# Patient Record
Sex: MALE | Race: BLACK OR AFRICAN AMERICAN | Employment: FULL TIME | URBAN - METROPOLITAN AREA
[De-identification: names, ages, dates, MRNs, and addresses within clinical notes are randomized per-mention and may not be internally consistent; named-entity substitution may affect disease eponyms.]

---

## 2017-01-03 ENCOUNTER — OFFICE VISIT (OUTPATIENT)
Dept: INTERNAL MEDICINE CLINIC | Age: 57
End: 2017-01-03

## 2017-01-03 VITALS
BODY MASS INDEX: 36.22 KG/M2 | HEART RATE: 81 BPM | TEMPERATURE: 98 F | DIASTOLIC BLOOD PRESSURE: 76 MMHG | RESPIRATION RATE: 16 BRPM | WEIGHT: 253 LBS | OXYGEN SATURATION: 94 % | HEIGHT: 70 IN | SYSTOLIC BLOOD PRESSURE: 130 MMHG

## 2017-01-03 DIAGNOSIS — I10 ESSENTIAL HYPERTENSION: ICD-10-CM

## 2017-01-03 DIAGNOSIS — Z12.11 COLON CANCER SCREENING: ICD-10-CM

## 2017-01-03 DIAGNOSIS — Z11.59 NEED FOR HEPATITIS C SCREENING TEST: ICD-10-CM

## 2017-01-03 DIAGNOSIS — K42.9 UMBILICAL HERNIA WITHOUT OBSTRUCTION AND WITHOUT GANGRENE: ICD-10-CM

## 2017-01-03 DIAGNOSIS — E11.9 TYPE 2 DIABETES MELLITUS WITHOUT COMPLICATION, WITHOUT LONG-TERM CURRENT USE OF INSULIN (HCC): Primary | ICD-10-CM

## 2017-01-03 LAB
CHOLEST SERPL-MCNC: 256 MG/DL
GLUCOSE POC: 186 MG/DL
HBA1C MFR BLD HPLC: 12 %
HDLC SERPL-MCNC: 36 MG/DL
LDL CHOLESTEROL POC: 199
NON-HDL GOAL (POC): 220
TCHOL/HDL RATIO (POC): 7.2
TRIGL SERPL-MCNC: 108 MG/DL

## 2017-01-03 RX ORDER — GLIPIZIDE 10 MG/1
TABLET ORAL
Qty: 60 TAB | Refills: 12 | Status: SHIPPED | OUTPATIENT
Start: 2017-01-03 | End: 2017-02-17 | Stop reason: SDUPTHER

## 2017-01-03 RX ORDER — ATORVASTATIN CALCIUM 40 MG/1
40 TABLET, FILM COATED ORAL DAILY
Qty: 30 TAB | Refills: 12 | Status: SHIPPED | OUTPATIENT
Start: 2017-01-03 | End: 2017-02-17 | Stop reason: SDUPTHER

## 2017-01-03 NOTE — MR AVS SNAPSHOT
Visit Information Date & Time Provider Department Dept. Phone Encounter #  
 1/3/2017  8:15 AM Violette Shane MD [address] [facility] [phone] [id number] Follow-up Instructions Return in about 3 months (around 4/3/2017), or if symptoms worsen or fail to improve. Upcoming Health Maintenance Date Due  
 EYE EXAM RETINAL OR DILATED Q1 6/2/1969 Pneumococcal 19-64 Medium Risk (1 of 1 - PPSV23) 6/2/1978 GLAUCOMA SCREENING Q2Y 6/2/2009 FOBT Q 1 YEAR AGE 50-75 9/29/2015 DTaP/Tdap/Td series (1 - Tdap) 10/26/2017* HEMOGLOBIN A1C Q6M 2/22/2017 MICROALBUMIN Q1 8/22/2017 LIPID PANEL Q1 8/22/2017 FOOT EXAM Q1 1/3/2018 *Topic was postponed. The date shown is not the original due date. Allergies as of 1/3/2017  Review Complete On: 1/3/2017 By: Violette Shane MD  
 No Known Allergies Current Immunizations  Never Reviewed No immunizations on file. Not reviewed this visit You Were Diagnosed With   
  
 Codes Comments Type 2 diabetes mellitus without complication, without long-term current use of insulin (HCC)    -  Primary ICD-10-CM: E11.9 ICD-9-CM: 250.00 Essential hypertension     ICD-10-CM: I10 
ICD-9-CM: 401.9 Need for hepatitis C screening test     ICD-10-CM: Z11.59 
ICD-9-CM: V73.89 Colon cancer screening     ICD-10-CM: Z12.11 ICD-9-CM: V76.51 Umbilical hernia without obstruction and without gangrene     ICD-10-CM: K42.9 ICD-9-CM: 553.1 Vitals BP Pulse Temp Resp Height(growth percentile) Weight(growth percentile) 130/76 81 98 °F (36.7 °C) (Oral) 16 5' 10\" (1.778 m) 253 lb (114.8 kg) SpO2 BMI Smoking Status 94% 36.3 kg/m2 Current Every Day Smoker BMI and BSA Data Body Mass Index Body Surface Area  
 36.3 kg/m 2 2.38 m 2 Preferred Pharmacy Pharmacy Name Phone  [facility] [address] [place] [address] [phone] Your Updated Medication List  
  
   
This list is accurate as of: 1/3/17  9:09 AM.  Always use your most recent med list.  
  
  
  
  
 atorvastatin 40 mg tablet Commonly known as:  LIPITOR Take 1 Tab by mouth daily. glipiZIDE 10 mg tablet Commonly known as:  GLUCOTROL  
TAKE 1 TABLET BY MOUTH TWICE DAILY losartan 25 mg tablet Commonly known as:  COZAAR  
TAKE 1 TABLET BY MOUTH EVERY DAY  
  
 metFORMIN 1,000 mg tablet Commonly known as:  GLUCOPHAGE  
TAKE 1 TABLET BY MOUTH TWICE DAILY WITH MEALS  
  
 sildenafil citrate 100 mg tablet Commonly known as:  VIAGRA Take 1 Tab by mouth as needed. SITagliptin 100 mg tablet Commonly known as:  Penny Homans Take 1 Tab by mouth daily. Prescriptions Sent to Pharmacy Refills  
 atorvastatin (LIPITOR) 40 mg tablet 12 Sig: Take 1 Tab by mouth daily. Class: Normal  
 Pharmacy: [address] Ph #: [phone] Route: Oral  
 glipiZIDE (GLUCOTROL) 10 mg tablet 12 Sig: TAKE 1 TABLET BY MOUTH TWICE DAILY Class: Normal  
 Pharmacy: [address] Ph #: [phone] Follow-up Instructions Return in about 3 months (around 4/3/2017), or if symptoms worsen or fail to improve. Referral Information Referral ID Referred By Referred To  
  
 [id number] [address] Damaris Watson MD   
   [address] [place] [address] Av Phone: [phone] Fax: [phone] Visits Status Start Date End Date 1 New Request 1/3/17 1/3/18 If your referral has a status of pending review or denied, additional information will be sent to support the outcome of this decision. Patient Instructions [employer] Activation Thank you for requesting access to Method CRM. Please follow the instructions below to securely access and download your online medical record. Method CRM allows you to send messages to your doctor, view your test results, renew your prescriptions, schedule appointments, and more. How Do I Sign Up? 1. In your internet browser, go to [email or web address] 
2. Click on the First Time User? Click Here link in the Sign In box. You will be redirect to the New Member Sign Up page. 3. Enter your Method CRM Access Code exactly as it appears below. You will not need to use this code after youve completed the sign-up process. If you do not sign up before the expiration date, you must request a new code. Method CRM Access Code: Activation code not generated Current Method CRM Status: Patient Declined (This is the date your Method CRM access code will ) 4. Enter the last four digits of your Social Security Number (xxxx) and Date of Birth (mm/dd/yyyy) as indicated and click Submit. You will be taken to the next sign-up page. 5. Create a Method CRM ID. This will be your Method CRM login ID and cannot be changed, so think of one that is secure and easy to remember. 6. Create a Method CRM password. You can change your password at any time. 7. Enter your Password Reset Question and Answer. This can be used at a later time if you forget your password. 8. Enter your e-mail address. You will receive e-mail notification when new information is available in [address] 9. Click Sign Up. You can now view and download portions of your medical record. 10. Click the Download Summary menu link to download a portable copy of your medical information. Additional Information If you have questions, please visit the Frequently Asked Questions section of the Method CRM website at [email or web address] Remember, Method CRM is NOT to be used for urgent needs. For medical emergencies, dial 911. Please provide this summary of care documentation to your next provider. If you have any questions after today's visit, please call [phone].

## 2017-01-03 NOTE — PATIENT INSTRUCTIONS
[employer] Activation    Thank you for requesting access to Exavio. Please follow the instructions below to securely access and download your online medical record. Exavio allows you to send messages to your doctor, view your test results, renew your prescriptions, schedule appointments, and more. How Do I Sign Up? 1. In your internet browser, go to [email or web address]  2. Click on the First Time User? Click Here link in the Sign In box. You will be redirect to the New Member Sign Up page. 3. Enter your Exavio Access Code exactly as it appears below. You will not need to use this code after youve completed the sign-up process. If you do not sign up before the expiration date, you must request a new code. Exavio Access Code: Activation code not generated  Current Exavio Status: Patient Declined (This is the date your Exavio access code will )    4. Enter the last four digits of your Social Security Number (xxxx) and Date of Birth (mm/dd/yyyy) as indicated and click Submit. You will be taken to the next sign-up page. 5. Create a Exavio ID. This will be your Exavio login ID and cannot be changed, so think of one that is secure and easy to remember. 6. Create a Exavio password. You can change your password at any time. 7. Enter your Password Reset Question and Answer. This can be used at a later time if you forget your password. 8. Enter your e-mail address. You will receive e-mail notification when new information is available in [address] 9. Click Sign Up. You can now view and download portions of your medical record. 10. Click the Download Summary menu link to download a portable copy of your medical information. Additional Information    If you have questions, please visit the Frequently Asked Questions section of the Exavio website at [email or web address] Remember, Exavio is NOT to be used for urgent needs. For medical emergencies, dial 911.

## 2017-01-03 NOTE — PROGRESS NOTES
Vianey Pike is a 61 y.o. male and presents with Diabetes; Hypertension; and Possible Hernia  . Subjective:  Hypertension Review:  The patient has hypertension . Diet and Lifestyle: generally follows a low sodium diet, exercises sporadically  Home BP Monitoring: is not measured at home. Pertinent ROS: taking medications as instructed, no medication side effects noted, no TIA's, no chest pain on exertion, no dyspnea on exertion, no swelling of ankles. Diabetes Mellitus Review:  He has diabetes mellitus. Diabetic ROS - medication compliance: compliant all of the time, diabetic diet compliance: compliant all of the time, home glucose monitoring: is performed. Known diabetic complications: none  Cardiovascular risk factors: family history, dyslipidemia, diabetes mellitus, obesity, hypertension  Current diabetic medications include oral agents/insulin   Eye exam current (within one year): no  Weight trend: stable  Prior visit with dietician: no  Current diet: \"healthy\" diet  in general  Current exercise: walking  Current monitoring regimen: home blood tests - daily  Home blood sugar records: trend: stable  Any episodes of hypoglycemia? no  Is He on ACE inhibitor or angiotensin II receptor blocker? Yes     He has an umbilical hernia      Review of Systems  Constitutional: negative for fevers, chills, anorexia and weight loss  Eyes:   negative for visual disturbance and irritation  ENT:   negative for tinnitus,sore throat,nasal congestion,ear pains. hoarseness  Respiratory:  negative for cough, hemoptysis, dyspnea,wheezing  CV:   negative for chest pain, palpitations, lower extremity edema  GI:   negative for nausea, vomiting, diarrhea, abdominal pain,melena  Endo:               negative for polyuria,polydipsia,polyphagia,heat intolerance  Genitourinary: negative for frequency, dysuria and hematuria  Integument:  negative for rash and pruritus  Hematologic:  negative for easy bruising and gum/nose bleeding  Musculoskel: negative for myalgias, arthralgias, back pain, muscle weakness, joint pain  Neurological:  negative for headaches, dizziness, vertigo, memory problems and gait   Behavl/Psych: negative for feelings of anxiety, depression, mood changes    Past Medical History   Diagnosis Date    Diabetes ([address]     Hypercholesterolemia      History reviewed. No pertinent past surgical history. Social History     Social History    Marital status:      Spouse name: N/A    Number of children: N/A    Years of education: N/A     Social History Main Topics    Smoking status: Current Every Day Smoker     Packs/day: 0.25    Smokeless tobacco: None    Alcohol use Yes    Drug use: No    Sexual activity: Yes     Partners: Female     Other Topics Concern    None     Social History Narrative     History reviewed. No pertinent family history. Current Outpatient Prescriptions   Medication Sig Dispense Refill    atorvastatin (LIPITOR) 40 mg tablet Take 1 Tab by mouth daily. 30 Tab 12    glipiZIDE (GLUCOTROL) 10 mg tablet TAKE 1 TABLET BY MOUTH TWICE DAILY 60 Tab 12    metFORMIN (GLUCOPHAGE) 1,000 mg tablet TAKE 1 TABLET BY MOUTH TWICE DAILY WITH MEALS 60 Tab 6    losartan (COZAAR) 25 mg tablet TAKE 1 TABLET BY MOUTH EVERY DAY 90 Tab 1    sitaGLIPtin (JANUVIA) 100 mg tablet Take 1 Tab by mouth daily. 30 Tab 12    sildenafil citrate (VIAGRA) 100 mg tablet Take 1 Tab by mouth as needed.  8 Tab 12     No Known Allergies    Objective:  Visit Vitals    /76    Pulse 81    Temp 98 °F (36.7 °C) (Oral)    Resp 16    Ht 5' 10\" (1.778 m)    Wt 253 lb (114.8 kg)    SpO2 94%    BMI 36.3 kg/m2     Physical Exam:   General appearance - alert, well appearing, and in no distress  Mental status - alert, oriented to person, place, and time  EYE-EMY, EOMI, corneas normal, no foreign bodies  ENT-ENT exam normal, no neck nodes or sinus tenderness  Nose - normal and patent, no erythema, discharge or polyps  Mouth - mucous membranes moist, pharynx normal without lesions  Neck - supple, no significant adenopathy   Chest - clear to auscultation, no wheezes, rales or rhonchi, symmetric air entry   Heart - normal rate, regular rhythm, normal S1, S2, no murmurs, rubs, clicks or gallops   Abdomen - soft, nontender, nondistended, no masses or organomegaly,umbilical hernia. Lymph- no adenopathy palpable  Ext-peripheral pulses normal, no pedal edema, no clubbing or cyanosis  Skin-Warm and dry. no hyperpigmentation, vitiligo, or suspicious lesions  Neuro -alert, oriented, normal speech, no focal findings or movement disorder noted  Neck-normal C-spine, no tenderness, full ROM without pain      Results for orders placed or performed in visit on 01/03/17   AMB POC GLUCOSE BLOOD, BY GLUCOSE MONITORING DEVICE   Result Value Ref Range    Glucose  mg/dL   AMB POC HEMOGLOBIN A1C   Result Value Ref Range    Hemoglobin A1c (POC) 12.0 %       Assessment/Plan:    ICD-10-CM ICD-9-CM    1. Type 2 diabetes mellitus without complication, without long-term current use of insulin (HCC) E11.9 250.00 AMB POC GLUCOSE BLOOD, BY GLUCOSE MONITORING DEVICE      AMB POC HEMOGLOBIN A1C      AMB POC LIPID PROFILE      CBC W/O DIFF      METABOLIC PANEL, COMPREHENSIVE   2. Essential hypertension I10 401.9 AMB POC GLUCOSE BLOOD, BY GLUCOSE MONITORING DEVICE      AMB POC HEMOGLOBIN A1C      AMB POC LIPID PROFILE      CBC W/O DIFF      METABOLIC PANEL, COMPREHENSIVE   3. Need for hepatitis C screening test Z11.59 V73.89 [place] HEPATITIS C SCREENING DOCUMENTED AS PERFORMED      HEPATITIS C AB   4. Colon cancer screening Z12.11 V76.51 OCCULT BLOOD, IMMUNOASSAY (FIT)   5.  Umbilical hernia without obstruction and without gangrene K42.9 553.1 REFERRAL TO GENERAL SURGERY     Orders Placed This Encounter    CBC W/O DIFF    METABOLIC PANEL, COMPREHENSIVE    HEPATITIS C AB    OCCULT BLOOD, IMMUNOASSAY (FIT)    REFERRAL TO GENERAL SURGERY     Referral Priority:   Routine     Referral Type:   Consultation     Referral Reason:   Specialty Services Required     Referred to Provider:   Keaton Plunkett MD     Number of Visits Requested:   1    AMB POC GLUCOSE BLOOD, BY GLUCOSE MONITORING DEVICE    AMB POC HEMOGLOBIN A1C    AMB POC LIPID PROFILE    [place] HEPATITIS C SCREENING DOCUMENTED AS PERFORMED    atorvastatin (LIPITOR) 40 mg tablet     Sig: Take 1 Tab by mouth daily. Dispense:  30 Tab     Refill:  12    glipiZIDE (GLUCOTROL) 10 mg tablet     Sig: TAKE 1 TABLET BY MOUTH TWICE DAILY     Dispense:  60 Tab     Refill:  12     lose weight, increase physical activity, follow low fat diet, follow low salt diet, continue present plan,Take 81mg aspirin daily  Patient Instructions   MyChart Activation    Thank you for requesting access to Clickable. Please follow the instructions below to securely access and download your online medical record. Clickable allows you to send messages to your doctor, view your test results, renew your prescriptions, schedule appointments, and more. How Do I Sign Up? 1. In your internet browser, go to [email or web address]  2. Click on the First Time User? Click Here link in the Sign In box. You will be redirect to the New Member Sign Up page. 3. Enter your Clickable Access Code exactly as it appears below. You will not need to use this code after youve completed the sign-up process. If you do not sign up before the expiration date, you must request a new code. Clickable Access Code: Activation code not generated  Current Clickable Status: Patient Declined (This is the date your CrossReadert access code will )    4. Enter the last four digits of your Social Security Number (xxxx) and Date of Birth (mm/dd/yyyy) as indicated and click Submit. You will be taken to the next sign-up page. 5. Create a Clickable ID. This will be your Clickable login ID and cannot be changed, so think of one that is secure and easy to remember.   6. Create a Kivra password. You can change your password at any time. 7. Enter your Password Reset Question and Answer. This can be used at a later time if you forget your password. 8. Enter your e-mail address. You will receive e-mail notification when new information is available in [address] 9. Click Sign Up. You can now view and download portions of your medical record. 10. Click the Download Summary menu link to download a portable copy of your medical information. Additional Information    If you have questions, please visit the Frequently Asked Questions section of the Kivra website at [email or web address] Remember, Kivra is NOT to be used for urgent needs. For medical emergencies, dial 911. Follow-up Disposition:  Return in about 3 months (around 4/3/2017), or if symptoms worsen or fail to improve. I have reviewed with the patient details of the assessment and plan and all questions were answered. Relevent patient education was performed    An After Visit Summary was printed and given to the patient.

## 2017-01-04 LAB
ALBUMIN SERPL-MCNC: 4.4 G/DL (ref 3.5–5.5)
ALBUMIN/GLOB SERPL: 1.3 {RATIO} (ref 1.1–2.5)
ALP SERPL-CCNC: 71 IU/L (ref 39–117)
ALT SERPL-CCNC: 35 IU/L (ref 0–44)
AST SERPL-CCNC: 20 IU/L (ref 0–40)
BILIRUB SERPL-MCNC: 0.2 MG/DL (ref 0–1.2)
BUN SERPL-MCNC: 11 MG/DL (ref 6–24)
BUN/CREAT SERPL: 10 (ref 9–20)
CALCIUM SERPL-MCNC: 9.9 MG/DL (ref 8.7–10.2)
CHLORIDE SERPL-SCNC: 99 MMOL/L (ref 96–106)
CO2 SERPL-SCNC: 22 MMOL/L (ref 18–29)
CREAT SERPL-MCNC: 1.08 MG/DL (ref 0.76–1.27)
ERYTHROCYTE [DISTWIDTH] IN BLOOD BY AUTOMATED COUNT: 16.7 % (ref 12.3–15.4)
GLOBULIN SER CALC-MCNC: 3.4 G/DL (ref 1.5–4.5)
GLUCOSE SERPL-MCNC: 159 MG/DL (ref 65–99)
HCT VFR BLD AUTO: 41.6 % (ref 37.5–51)
HCV AB S/CO SERPL IA: <0.1 S/CO RATIO (ref 0–0.9)
HGB BLD-MCNC: 13.2 G/DL (ref 12.6–17.7)
MCH RBC QN AUTO: 22.6 PG (ref 26.6–33)
MCHC RBC AUTO-ENTMCNC: 31.7 G/DL (ref 31.5–35.7)
MCV RBC AUTO: 71 FL (ref 79–97)
PLATELET # BLD AUTO: 339 X10E3/UL (ref 150–379)
POTASSIUM SERPL-SCNC: 5 MMOL/L (ref 3.5–5.2)
PROT SERPL-MCNC: 7.8 G/DL (ref 6–8.5)
RBC # BLD AUTO: 5.83 X10E6/UL (ref 4.14–5.8)
SODIUM SERPL-SCNC: 142 MMOL/L (ref 134–144)
WBC # BLD AUTO: 7.5 X10E3/UL (ref 3.4–10.8)

## 2017-02-14 ENCOUNTER — DOCUMENTATION ONLY (OUTPATIENT)
Dept: INTERNAL MEDICINE CLINIC | Age: 57
End: 2017-02-14

## 2017-02-14 NOTE — PROGRESS NOTES
Patient came in office and requested referral on Friday 2/10/17 -  spoke to Dr. Sigifredo Rojo office on 2/13/17 and told them that referral would be in place on 2/14/17. Pt came to office on 2/14/17 in the AM and was told by  that referral would be in place by the time of his appointment. Insurance contacted this office after, and we were told that the referral takes 5-7 buisness days and that  they would backdate the referral once approved.  spoke to Dr. Sigifredo Rojo office Anthony Clark) and shared with her what were told. We were also given a reference number #[id number] . Pt upset and states that we are incompetent and this should have been done back in January when he was seen here.  In January patient had [employer] which did not need a referral.

## 2017-02-15 ENCOUNTER — OFFICE VISIT (OUTPATIENT)
Dept: SURGERY | Age: 57
End: 2017-02-15

## 2017-02-15 VITALS
OXYGEN SATURATION: 97 % | HEIGHT: 70 IN | HEART RATE: 87 BPM | SYSTOLIC BLOOD PRESSURE: 132 MMHG | RESPIRATION RATE: 18 BRPM | BODY MASS INDEX: 34.79 KG/M2 | WEIGHT: 243 LBS | TEMPERATURE: 98.6 F | DIASTOLIC BLOOD PRESSURE: 86 MMHG

## 2017-02-15 DIAGNOSIS — K42.9 UMBILICAL HERNIA WITHOUT OBSTRUCTION AND WITHOUT GANGRENE: Primary | ICD-10-CM

## 2017-02-15 NOTE — PROGRESS NOTES
HISTORY OF PRESENT ILLNESS  Jo Ann Beard is a 61 y.o. male who is referred by Dr. [address] for further evaluation of an umbilical hernia. HPI Comments: Mr. Sammy Singletary tells me that he noted a bulge in his abdominal wall above the umbilicus in 4/5623. Since then, the bulge has become somewhat larger and more bothersome to him. Found to have an umbilical hernia. He has otherwise been in his usual state of health. Past Medical History:    Diabetes ([address]                                                Hypercholesterolemia                                          Umbilical hernia without obstruction and witho* 2/15/2017     History reviewed. No pertinent surgical history. History reviewed. No pertinent family history. Social History: Employment - Security. Tobacco - Cigarettes, occasionally. EtOH - Beer, 3-4 per month. Review of systems negative except as noted. Review of Systems   Constitutional: Negative for chills and fever. Gastrointestinal: Positive for abdominal pain. Negative for constipation, nausea and vomiting. Physical Exam   Constitutional: He appears well-developed and well-nourished. No distress. HENT:   Head: Normocephalic and atraumatic. Eyes: No scleral icterus. Neck: Neck supple. Cardiovascular: Normal rate and regular rhythm. Pulmonary/Chest: Effort normal and breath sounds normal.   Abdominal: Soft. He exhibits no distension. There is no tenderness. There is no rebound and no guarding. A hernia is present. Hernia confirmed positive in the ventral area (Above umbilicus in midline. ). Musculoskeletal: Normal range of motion. Lymphadenopathy:     He has no cervical adenopathy. Neurological: He is alert. Vitals reviewed. ASSESSMENT and PLAN  Mr. Sammy Singletary is a 61 y.o. male with an umbilical hernia. He should benefit from repair since the hernia is symptomatic.   I discussed umbilical hernia repair, possibly with mesh, with him today including the potential risks of bleeding, infection and recurrence of the hernia. He understands and wishes to proceed. I have tentatively scheduled Mr. Mcclelland for surgery on March 2, 2017 at [facility] and will see him back in the office post-operatively. He is agreeable to this plan of action and is most certainly free to contact the office should any questions or concerns arise.        CC: Kem Marley MD

## 2017-02-17 RX ORDER — ATORVASTATIN CALCIUM 40 MG/1
40 TABLET, FILM COATED ORAL DAILY
Qty: 30 TAB | Refills: 12 | Status: SHIPPED | OUTPATIENT
Start: 2017-02-17

## 2017-02-17 RX ORDER — LOSARTAN POTASSIUM 25 MG/1
TABLET ORAL
Qty: 90 TAB | Refills: 1 | Status: SHIPPED | OUTPATIENT
Start: 2017-02-17 | End: 2017-07-28 | Stop reason: SDUPTHER

## 2017-02-17 RX ORDER — BUPIVACAINE HYDROCHLORIDE 2.5 MG/ML
30 INJECTION, SOLUTION EPIDURAL; INFILTRATION; INTRACAUDAL ONCE
Status: CANCELLED | OUTPATIENT
Start: 2017-02-17 | End: 2017-02-17

## 2017-02-17 RX ORDER — GLIPIZIDE 10 MG/1
TABLET ORAL
Qty: 60 TAB | Refills: 12 | Status: SHIPPED | OUTPATIENT
Start: 2017-02-17

## 2017-02-17 RX ORDER — METFORMIN HYDROCHLORIDE 1000 MG/1
TABLET ORAL
Qty: 60 TAB | Refills: 6 | Status: SHIPPED | OUTPATIENT
Start: 2017-02-17 | End: 2017-12-18 | Stop reason: SDUPTHER

## 2017-02-27 ENCOUNTER — HOSPITAL ENCOUNTER (OUTPATIENT)
Dept: PREADMISSION TESTING | Age: 57
Discharge: HOME | End: 2017-02-27
Payer: COMMERCIAL

## 2017-02-27 VITALS
WEIGHT: 247.38 LBS | DIASTOLIC BLOOD PRESSURE: 88 MMHG | HEART RATE: 78 BPM | TEMPERATURE: 98.1 F | SYSTOLIC BLOOD PRESSURE: 164 MMHG | RESPIRATION RATE: 16 BRPM | HEIGHT: 70 IN | BODY MASS INDEX: 35.42 KG/M2

## 2017-02-27 RX ORDER — IBUPROFEN 200 MG
1 TABLET ORAL EVERY 24 HOURS
COMMUNITY

## 2017-02-27 NOTE — PERIOP NOTES
[facility]  Preoperative Instructions      Surgery Date 03/02/2017              Time of Arrival  8:30    1. On the day of your surgery, please report to the Critical access hospital HOSPITALS Entrance. If arriving prior to 7 AM please enter through the Emergency Room Entrance. 2. You must have a responsible adult to drive you to the hospital, stay at the hospital during your surgery and drive you home. You should have someone stay with you for the first 24 hours after your surgery. You should not drive a car for 24 hours following surgery. 3. Do not have anything to eat or drink ( including water, gum, mints, coffee, juice) after midnight . This may not apply to medications prescribed by your physician. Please note special instructions, if applicable. If you are currently taking Plavix, Coumadin, or other blood-thinning agents, contact your surgeon for instructions. 4. We recommend you do not drink any alcoholic beverages for 24 hours before and after your surgery. 5. Have a list of all current medications, including vitamins, herbal supplements and any other over the counter medications. Stop Asprin and non-steroidal anti-inflammatory drugs (I.e. Advil, Aleve) as directed by your surgeon's office. Stop all vitamins and herbal supplements seven days prior to your surgery. 6. Wear comfortable clothes. Wear glasses instead of contacts. Do not bring any money or jewelry. Do not wear make-up, particularly mascara, the morning of your surgery. Do not wear nail polish, particularly if you are having foot /hand surgery. Wear your hair loose or down, no ponytails, buns, yashira pins or clips. All body piercings must be removed. Please shower before surgery with an antibacterial soap (Safeguard/Dial) and use a clean towel. Do not apply any lotions, powders, cologne, perfume or deodorants afterward.     Do not shave the area around your surgical incision for at least two to three days prior to your surgery. If you wear glasses, contacts, dentures and/or hearing aids, they will be removed prior to surgery. 7. You should understand that if you do not follow these instructions your surgery may be cancelled. If your physical condition changes (I.e. fever, cold or flu) please contact your surgeon as soon as possible. 8. It is important that you be on time. If a situation occurs where you may be late, please call [phone]    9. If you are feeling sick before surgery, call your surgeon. He or she will tell you what to do. If you are sick on the day of your surgery please call [phone].     10. If you have any questions and or problems, please call [phone] (Pre-admission Testing). 11. Your surgery time may be subject to change. You will receive a phone call the evening before your surgery if your time changes. Special Instructions:     MEDICATIONS TO TAKE THE MORNING OF SURGERY WITH A SIP OF WATER:      I understand a pre-operative phone call will be made to verify my surgery time.   In the event that I am not available, I give permission for a message to be left on my answering service and/or with another person?            ___________________      ___________________  _________  (Signature of Patient)          (Witness)                              (Date and Time)

## 2017-03-01 ENCOUNTER — TELEPHONE (OUTPATIENT)
Dept: SURGERY | Age: 57
End: 2017-03-01

## 2017-03-01 NOTE — TELEPHONE ENCOUNTER
Patient identified with two patient identifiers. Patient scheduled for hernia repair tomorrow requesting letter to return to work this Saturday 3/4/17. Patient states he spoke with surgical coordinator who informed Dr. Moshe Fox he wants to return on Saturday and he does not do any heavy lifting, pushing, or pulling of any kind. Per patient \" I don't lift anything heavier than my sandwich\". Per patient he does a lot of sitting and monitoring. Patient states he was instructed to ask for letter of release for work when he is discharged tomorrow after surgery. Patient agreed he will call if any other questions or concerns.

## 2017-03-02 ENCOUNTER — HOSPITAL ENCOUNTER (OUTPATIENT)
Age: 57
Setting detail: OUTPATIENT SURGERY
Discharge: HOME | End: 2017-03-02
Attending: SURGERY | Admitting: SURGERY
Payer: COMMERCIAL

## 2017-03-02 ENCOUNTER — ANESTHESIA EVENT (OUTPATIENT)
Dept: SURGERY | Age: 57
End: 2017-03-02
Payer: COMMERCIAL

## 2017-03-02 ENCOUNTER — TELEPHONE (OUTPATIENT)
Dept: INTERNAL MEDICINE CLINIC | Age: 57
End: 2017-03-02

## 2017-03-02 ENCOUNTER — ANESTHESIA (OUTPATIENT)
Dept: SURGERY | Age: 57
End: 2017-03-02
Payer: COMMERCIAL

## 2017-03-02 VITALS
WEIGHT: 247 LBS | SYSTOLIC BLOOD PRESSURE: 178 MMHG | TEMPERATURE: 97.5 F | BODY MASS INDEX: 35.36 KG/M2 | RESPIRATION RATE: 23 BRPM | DIASTOLIC BLOOD PRESSURE: 88 MMHG | OXYGEN SATURATION: 98 % | HEIGHT: 70 IN | HEART RATE: 119 BPM

## 2017-03-02 LAB
GLUCOSE BLD STRIP.AUTO-MCNC: 198 MG/DL (ref 65–100)
SERVICE CMNT-IMP: ABNORMAL

## 2017-03-02 PROCEDURE — 74011000250 HC RX REV CODE- 250

## 2017-03-02 PROCEDURE — 74011250636 HC RX REV CODE- 250/636

## 2017-03-02 PROCEDURE — 82962 GLUCOSE BLOOD TEST: CPT

## 2017-03-02 RX ORDER — FENTANYL CITRATE 50 UG/ML
INJECTION, SOLUTION INTRAMUSCULAR; INTRAVENOUS AS NEEDED
Status: DISCONTINUED | OUTPATIENT
Start: 2017-03-02 | End: 2017-03-02 | Stop reason: HOSPADM

## 2017-03-02 RX ORDER — CEFAZOLIN SODIUM IN 0.9 % NACL 2 G/100 ML
PLASTIC BAG, INJECTION (ML) INTRAVENOUS
Status: DISCONTINUED
Start: 2017-03-02 | End: 2017-03-02 | Stop reason: HOSPADM

## 2017-03-02 RX ORDER — ONDANSETRON 2 MG/ML
INJECTION INTRAMUSCULAR; INTRAVENOUS AS NEEDED
Status: DISCONTINUED | OUTPATIENT
Start: 2017-03-02 | End: 2017-03-02 | Stop reason: HOSPADM

## 2017-03-02 RX ORDER — SUCCINYLCHOLINE CHLORIDE 20 MG/ML
INJECTION INTRAMUSCULAR; INTRAVENOUS AS NEEDED
Status: DISCONTINUED | OUTPATIENT
Start: 2017-03-02 | End: 2017-03-02 | Stop reason: HOSPADM

## 2017-03-02 RX ORDER — CEFAZOLIN SODIUM IN 0.9 % NACL 2 G/100 ML
PLASTIC BAG, INJECTION (ML) INTRAVENOUS AS NEEDED
Status: DISCONTINUED | OUTPATIENT
Start: 2017-03-02 | End: 2017-03-02 | Stop reason: HOSPADM

## 2017-03-02 RX ORDER — PROPOFOL 10 MG/ML
INJECTION, EMULSION INTRAVENOUS AS NEEDED
Status: DISCONTINUED | OUTPATIENT
Start: 2017-03-02 | End: 2017-03-02 | Stop reason: HOSPADM

## 2017-03-02 RX ORDER — BUPIVACAINE HYDROCHLORIDE 2.5 MG/ML
30 INJECTION, SOLUTION EPIDURAL; INFILTRATION; INTRACAUDAL ONCE
Status: DISCONTINUED | OUTPATIENT
Start: 2017-03-02 | End: 2017-03-02 | Stop reason: HOSPADM

## 2017-03-02 RX ORDER — MIDAZOLAM HYDROCHLORIDE 1 MG/ML
INJECTION, SOLUTION INTRAMUSCULAR; INTRAVENOUS AS NEEDED
Status: DISCONTINUED | OUTPATIENT
Start: 2017-03-02 | End: 2017-03-02 | Stop reason: HOSPADM

## 2017-03-02 RX ORDER — LIDOCAINE HYDROCHLORIDE 20 MG/ML
INJECTION, SOLUTION EPIDURAL; INFILTRATION; INTRACAUDAL; PERINEURAL AS NEEDED
Status: DISCONTINUED | OUTPATIENT
Start: 2017-03-02 | End: 2017-03-02 | Stop reason: HOSPADM

## 2017-03-02 RX ORDER — CEFAZOLIN SODIUM IN 0.9 % NACL 2 G/100 ML
2 PLASTIC BAG, INJECTION (ML) INTRAVENOUS
Status: DISCONTINUED | OUTPATIENT
Start: 2017-03-02 | End: 2017-03-02 | Stop reason: HOSPADM

## 2017-03-02 RX ADMIN — ONDANSETRON 4 MG: 2 INJECTION INTRAMUSCULAR; INTRAVENOUS at 10:35

## 2017-03-02 RX ADMIN — SUCCINYLCHOLINE CHLORIDE 140 MG: 20 INJECTION INTRAMUSCULAR; INTRAVENOUS at 10:56

## 2017-03-02 RX ADMIN — SUCCINYLCHOLINE CHLORIDE 140 MG: 20 INJECTION INTRAMUSCULAR; INTRAVENOUS at 11:17

## 2017-03-02 RX ADMIN — Medication 2 G: at 10:43

## 2017-03-02 RX ADMIN — MIDAZOLAM HYDROCHLORIDE 2 MG: 1 INJECTION, SOLUTION INTRAMUSCULAR; INTRAVENOUS at 10:35

## 2017-03-02 RX ADMIN — SUCCINYLCHOLINE CHLORIDE 140 MG: 20 INJECTION INTRAMUSCULAR; INTRAVENOUS at 10:46

## 2017-03-02 RX ADMIN — FENTANYL CITRATE 100 MCG: 50 INJECTION, SOLUTION INTRAMUSCULAR; INTRAVENOUS at 10:46

## 2017-03-02 RX ADMIN — PROPOFOL 200 MG: 10 INJECTION, EMULSION INTRAVENOUS at 10:46

## 2017-03-02 RX ADMIN — PROPOFOL 200 MG: 10 INJECTION, EMULSION INTRAVENOUS at 11:17

## 2017-03-02 RX ADMIN — LIDOCAINE HYDROCHLORIDE 100 MG: 20 INJECTION, SOLUTION EPIDURAL; INFILTRATION; INTRACAUDAL; PERINEURAL at 10:46

## 2017-03-02 RX ADMIN — PROPOFOL 150 MG: 10 INJECTION, EMULSION INTRAVENOUS at 10:56

## 2017-03-02 NOTE — ANESTHESIA POSTPROCEDURE EVALUATION
Post-Anesthesia Evaluation and Assessment    Patient: Gail Landeros MRN: [id number]  SSN: [id number]    YOB: 1960  Age: 61 y.o. Sex: male       Cardiovascular Function/Vital Signs  Visit Vitals    /88    Pulse (!) 119    Temp 36.4 °C (97.5 °F)    Resp 23    Ht 5' 10\" (1.778 m)    Wt 112 kg (247 lb)    SpO2 98%    BMI 35.44 kg/m2       Patient is status post general anesthesia for Procedure(s):  PROCEDURE CANCELLED - NOT PERFORMED. Nausea/Vomiting: None    Postoperative hydration reviewed and adequate. Pain:  Pain Scale 1: Numeric (0 - 10) (03/02/17 1200)  Pain Intensity 1: 0 (03/02/17 1200)   Managed    Neurological Status:   Neuro (WDL): Within Defined Limits (03/02/17 1200)  Neuro  Neurologic State: Alert; Appropriate for age (03/02/17 1200)  LUE Motor Response: Purposeful (03/02/17 1200)  LLE Motor Response: Purposeful (03/02/17 1200)  RUE Motor Response: Purposeful (03/02/17 1200)  RLE Motor Response: Purposeful (03/02/17 1200)   At baseline    Mental Status and Level of Consciousness: Arousable    Pulmonary Status:   O2 Device: Room air (03/02/17 1144)   Adequate oxygenation and airway patent    Complications related to anesthesia: None    Post-anesthesia assessment completed.  No concerns    Signed By: Nicolas Beth MD     March 2, 2017

## 2017-03-02 NOTE — PROGRESS NOTES
Patient unable to be intubated. Surgery cancelled. Mr. Riley Castro returned to PACU in stable condition. Explained to Jennifer Stas that surgery was not performed due to inability to intubate. Will discharge to home. Will reschedule surgery at [facility] if he so desires.

## 2017-03-02 NOTE — PERIOP NOTES
Handoff Report from Operating Room to PACU    Report received from Dr. Adela Mathews and Shari Bautista RN regarding Maki Ortiz. Surgeon(s):  Lucero Tracy MD  And Procedure(s) (LRB):  REPAIR HERNIA UMBILICAL POSSIBLE MESH  (N/A)  confirmed   with allergies and difficult intubation discussed. Anesthesia type, drugs, patient history, complications, estimated blood loss, vital signs, intake and output, and last pain medication, lines and temperature were reviewed.

## 2017-03-02 NOTE — H&P
Date of Surgery Update:  Sunil Talley was seen and examined. History and physical has been reviewed. The patient has been examined. There have been no significant clinical changes since the completion of the originally dated History and Physical.  Patient identified by surgeon; surgical site was confirmed by patient and surgeon. Signed By: Sun Boyle MD     March 2, 2017 10:19 AM         Please note from the office and include the additional information below:    Past Medical History  Past Medical History:   Diagnosis Date    Diabetes ([address]     Hypercholesterolemia     Umbilical hernia without obstruction and without gangrene 2/15/2017        Past Surgical History  History reviewed. No pertinent surgical history. Social History  The patient Sunil Talley  reports that he has been smoking. He has been smoking about 0.25 packs per day. He does not have any smokeless tobacco history on file. He reports that he drinks alcohol. He reports that he does not use illicit drugs. Family History  History reviewed. No pertinent family history.

## 2017-03-02 NOTE — PERIOP NOTES
Patient is educated on the fact that he was unable to be intubated. Pt was yelling and screaming at staff, using foul language and being threatening. Despite every opportunity to explain the situation, pt screaming and belligerent towards staff, anesthesia and Dr. Liz Piña. Pt voiced concerns about being charged for care. OR supervisor contacted administration regarding bill. Administration came to PACU, spoke with patient and said they would not charge pt for care received. Pt refused to let RN staff assist with getting dressed. Staff around pt for safety. Pt ride was called and pt refused to stay in PACU and wait for ride. Pt left PACU, continuing to scream and using foul language. Security called and escorted pt out of building while he awaited ride.

## 2017-03-02 NOTE — ANESTHESIA PREPROCEDURE EVALUATION
Anesthetic History   No history of anesthetic complications            Review of Systems / Medical History  Patient summary reviewed and pertinent labs reviewed    Pulmonary  Within defined limits                 Neuro/Psych   Within defined limits           Cardiovascular                  Exercise tolerance: >4 METS     GI/Hepatic/Renal                Endo/Other    Diabetes    Obesity     Other Findings              Physical Exam    Airway  Mallampati: II  TM Distance: 4 - 6 cm  Neck ROM: normal range of motion   Mouth opening: Normal     Cardiovascular    Rhythm: regular  Rate: normal         Dental    Dentition: Upper partial plate and Lower dentition intact     Pulmonary  Breath sounds clear to auscultation               Abdominal  GI exam deferred       Other Findings            Anesthetic Plan    ASA: 3  Anesthesia type: general          Induction: Intravenous  Anesthetic plan and risks discussed with: Patient

## 2017-03-02 NOTE — PROGRESS NOTES
Unable to intubate using direct laryngoscopy and Glidescope. Patient has short neck, is obese and possible left tracheal deviation.

## 2017-03-06 LAB
ATRIAL RATE: 70 BPM
CALCULATED P AXIS, ECG09: 57 DEGREES
CALCULATED R AXIS, ECG10: -17 DEGREES
CALCULATED T AXIS, ECG11: 114 DEGREES
DIAGNOSIS, 93000: NORMAL
P-R INTERVAL, ECG05: 158 MS
Q-T INTERVAL, ECG07: 392 MS
QRS DURATION, ECG06: 82 MS
QTC CALCULATION (BEZET), ECG08: 423 MS
VENTRICULAR RATE, ECG03: 70 BPM

## 2017-03-10 RX ORDER — BUPIVACAINE HYDROCHLORIDE 2.5 MG/ML
30 INJECTION, SOLUTION EPIDURAL; INFILTRATION; INTRACAUDAL ONCE
Status: CANCELLED | OUTPATIENT
Start: 2017-03-10 | End: 2017-03-10

## 2017-03-16 ENCOUNTER — TELEPHONE (OUTPATIENT)
Dept: SURGERY | Age: 57
End: 2017-03-16

## 2017-03-16 NOTE — TELEPHONE ENCOUNTER
I answered all questions regarding pre admission . He also has questions about the previous procedure at [address] [place] [address]- specifically the apparatus used in his throat. He is reporting his mouth/teeth are still sore and very sensitive to pressure. He would also like to find out next step with return to work release.  Patient works nights so if possible call before 11:00am

## 2017-03-17 NOTE — TELEPHONE ENCOUNTER
Patient identified with two patient identifiers. Patient requesting to return to work the same night as day of surgery. Patient informed we can not release him to return to work same day as surgery since he is getting general anesthesia. Per patient he will requesting Thursday and Friday off from work and contact me or note if needed. Patient states he still has some discomfort of the teeth and gums after previous attempt to intubate. He will continue to monitor and follow up if needed if discomfort does not subside.

## 2017-03-20 ENCOUNTER — OFFICE VISIT (OUTPATIENT)
Dept: INTERNAL MEDICINE CLINIC | Age: 57
End: 2017-03-20

## 2017-03-20 VITALS
OXYGEN SATURATION: 98 % | HEIGHT: 70 IN | HEART RATE: 94 BPM | RESPIRATION RATE: 16 BRPM | SYSTOLIC BLOOD PRESSURE: 150 MMHG | TEMPERATURE: 97.7 F | DIASTOLIC BLOOD PRESSURE: 70 MMHG | WEIGHT: 240 LBS | BODY MASS INDEX: 34.36 KG/M2

## 2017-03-20 DIAGNOSIS — E11.9 TYPE 2 DIABETES MELLITUS WITHOUT COMPLICATION, WITHOUT LONG-TERM CURRENT USE OF INSULIN (HCC): Primary | ICD-10-CM

## 2017-03-20 DIAGNOSIS — J20.9 ACUTE BRONCHITIS, UNSPECIFIED ORGANISM: ICD-10-CM

## 2017-03-20 DIAGNOSIS — I10 ESSENTIAL HYPERTENSION: ICD-10-CM

## 2017-03-20 DIAGNOSIS — K42.9 UMBILICAL HERNIA WITHOUT OBSTRUCTION AND WITHOUT GANGRENE: ICD-10-CM

## 2017-03-20 DIAGNOSIS — E78.00 HYPERCHOLESTEREMIA: ICD-10-CM

## 2017-03-20 RX ORDER — AZITHROMYCIN 250 MG/1
TABLET, FILM COATED ORAL
Qty: 6 TAB | Refills: 0 | Status: ON HOLD | OUTPATIENT
Start: 2017-03-20 | End: 2017-03-29

## 2017-03-20 NOTE — MR AVS SNAPSHOT
Visit Information Date & Time Provider Department Dept. Phone Encounter #  
 3/20/2017  9:00 AM Froylan Cee MD [address] [place] [address] [phone] [id number] Follow-up Instructions Return in about 3 months (around 6/20/2017), or if symptoms worsen or fail to improve. Your Appointments 4/4/2017  8:15 AM  
ROUTINE CARE with Froylan Cee MD  
[facility] Iraj Sandoval ([facility]) Appt Note: 3 month fu  
 [address] [facility] [place] [id number]  
[phone]  
  
   
 [address] [facility],[address] [place] [id number] 4/13/2017  1:00 PM  
POST OP 10 MIN with William Mcadams MD  
[address] [place] [address] ([facility]) Appt Note: [id number] Umbilical Hernia, Possible Mesh. po  
 [address] [phone]  
[place] Upcoming Health Maintenance Date Due  
 EYE EXAM RETINAL OR DILATED Q1 6/2/1969 Pneumococcal 19-64 Medium Risk (1 of 1 - PPSV23) 6/2/1978 GLAUCOMA SCREENING Q2Y 6/2/2009 FOBT Q 1 YEAR AGE 50-75 9/29/2015 DTaP/Tdap/Td series (1 - Tdap) 10/26/2017* HEMOGLOBIN A1C Q6M 7/3/2017 MICROALBUMIN Q1 8/22/2017 FOOT EXAM Q1 1/3/2018 LIPID PANEL Q1 1/3/2018 *Topic was postponed. The date shown is not the original due date. Allergies as of 3/20/2017  Review Complete On: 3/20/2017 By: Froylan Cee MD  
 No Known Allergies Current Immunizations  Never Reviewed No immunizations on file. Not reviewed this visit You Were Diagnosed With   
  
 Codes Comments Type 2 diabetes mellitus without complication, without long-term current use of insulin (HCC)    -  Primary ICD-10-CM: E11.9 ICD-9-CM: 250.00 Essential hypertension     ICD-10-CM: I10 
ICD-9-CM: 401.9 Umbilical hernia without obstruction and without gangrene     ICD-10-CM: K42.9 ICD-9-CM: 553.1 Hypercholesteremia     ICD-10-CM: E78.00 ICD-9-CM: 272.0 Acute bronchitis, unspecified organism     ICD-10-CM: J20.9 ICD-9-CM: 466.0 Vitals BP Pulse Temp Resp Height(growth percentile) Weight(growth percentile) 150/70 94 97.7 °F (36.5 °C) (Oral) 16 5' 10\" (1.778 m) 240 lb (108.9 kg) SpO2 BMI Smoking Status 98% 34.44 kg/m2 Current Every Day Smoker Vitals History BMI and BSA Data Body Mass Index Body Surface Area 34.44 kg/m 2 2.32 m 2 Preferred Pharmacy Pharmacy Name Phone [id number]- [facility], [address] [facility] [phone] Your Updated Medication List  
  
   
This list is accurate as of: 3/20/17  9:45 AM.  Always use your most recent med list.  
  
  
  
  
 atorvastatin 40 mg tablet Commonly known as:  LIPITOR Take 1 Tab by mouth daily. azithromycin 250 mg tablet Commonly known as:  Evelene Smiling 2 tabs today and then 1 tab daily for 4 days  
  
 glipiZIDE 10 mg tablet Commonly known as:  GLUCOTROL  
TAKE 1 TABLET BY MOUTH TWICE DAILY losartan 25 mg tablet Commonly known as:  COZAAR  
TAKE 1 TABLET BY MOUTH EVERY DAY  
  
 metFORMIN 1,000 mg tablet Commonly known as:  GLUCOPHAGE  
TAKE 1 TABLET BY MOUTH TWICE DAILY WITH MEALS  
  
 NICODERM CQ 21 mg/24 hr  
Generic drug:  nicotine 1 Patch by TransDERmal route every twenty-four (24) hours. Indications: NICOTINE DEPENDENCE  
  
 sildenafil citrate 100 mg tablet Commonly known as:  VIAGRA Take 1 Tab by mouth as needed. SITagliptin 100 mg tablet Commonly known as:  Melia Rena Take 1 Tab by mouth daily. Prescriptions Sent to Pharmacy Refills  
 azithromycin (ZITHROMAX) 250 mg tablet 0 Si tabs today and then 1 tab daily for 4 days Class: Normal  
 Pharmacy: [facility]/pharmacy [address] [place] [address] #: [phone] Follow-up Instructions Return in about 3 months (around 2017), or if symptoms worsen or fail to improve. To-Do List   
 2017 8:00 AM  
  Appointment with [facility] PAT EXAM RM 5 at [address] ([phone]) Patient Instructions MyChart Activation Thank you for requesting access to Gewara. Please follow the instructions below to securely access and download your online medical record. Gewara allows you to send messages to your doctor, view your test results, renew your prescriptions, schedule appointments, and more. How Do I Sign Up? 1. In your internet browser, go to [email or web address] 
2. Click on the First Time User? Click Here link in the Sign In box. You will be redirect to the New Member Sign Up page. 3. Enter your Gewara Access Code exactly as it appears below. You will not need to use this code after youve completed the sign-up process. If you do not sign up before the expiration date, you must request a new code. Gewara Access Code: Activation code not generated Current Gewara Status: Patient Declined (This is the date your Gewara access code will ) 4. Enter the last four digits of your Social Security Number (xxxx) and Date of Birth (mm/dd/yyyy) as indicated and click Submit. You will be taken to the next sign-up page. 5. Create a Gewara ID. This will be your Gewara login ID and cannot be changed, so think of one that is secure and easy to remember. 6. Create a Gewara password. You can change your password at any time. 7. Enter your Password Reset Question and Answer. This can be used at a later time if you forget your password. 8. Enter your e-mail address. You will receive e-mail notification when new information is available in [address] 9. Click Sign Up. You can now view and download portions of your medical record. 10. Click the Download Summary menu link to download a portable copy of your medical information. Additional Information If you have questions, please visit the Frequently Asked Questions section of the WHILLhart website at [email or web address] Remember, ITemat is NOT to be used for urgent needs. For medical emergencies, dial 911. Please provide this summary of care documentation to your next provider. Your primary care clinician is listed as Jacklyn Colindres. If you have any questions after today's visit, please call [phone].

## 2017-03-20 NOTE — PROGRESS NOTES
Rene Bagley is a 61 y.o. male and presents with Cold Symptoms  . Subjective:  Upper respiratory infection Review:  Rene Bagley is a 61 y.o. male who complains of nasal congestion,productive cough, for the past few days, gradually worsening since that time. He denies a history of shortness of breath. Evaluation to date: none. Treatment to date: decongestants, antihistamines, cough suppressants, OTC products. Patient does not smoke cigarettes. Relevant PMH: No pertinent additional PMH. Hypertension Review:  The patient has hypertension . Diet and Lifestyle: generally follows a low sodium diet, exercises sporadically  Home BP Monitoring: is not measured at home. Pertinent ROS: taking medications as instructed, no medication side effects noted, no TIA's, no chest pain on exertion, no dyspnea on exertion, no swelling of ankles. Dyslipidemia Review:  Patient presents for evaluation of lipids. Compliance with treatment thus far has been excellent. A repeat fasting lipid profile was done. The patient does not use medications that may worsen dyslipidemias . The patient exercises sporadically. The patient is not known to have coexisting coronary artery disease    Diabetes Mellitus Review:  He has diabetes mellitus. Diabetic ROS - medication compliance: compliant all of the time, diabetic diet compliance: compliant all of the time, home glucose monitoring: is performed.    Known diabetic complications: none  Cardiovascular risk factors: family history, dyslipidemia, diabetes mellitus, obesity, hypertension  Current diabetic medications include oral agents/   Eye exam current (within one year): no  Weight trend: stable  Prior visit with dietician: no  Current diet: \"healthy\" diet  in general  Current exercise: walking  Current monitoring regimen: home blood tests - daily  Home blood sugar records: trend: stable  Any episodes of hypoglycemia? no  Is He on ACE inhibitor or angiotensin II receptor blocker? Yes   Lab review: orders written for new lab studies as appropriate; see orders. Review of Systems  Constitutional: negative for fevers, chills, anorexia and weight loss  Eyes:   negative for visual disturbance and irritation  ENT:   negative for tinnitus,sore throat,nasal congestion,ear pains. hoarseness  Respiratory:  negative for cough, hemoptysis, dyspnea,wheezing  CV:   negative for chest pain, palpitations, lower extremity edema  GI:   negative for nausea, vomiting, diarrhea, abdominal pain,melena  Endo:               negative for polyuria,polydipsia,polyphagia,heat intolerance  Genitourinary: negative for frequency, dysuria and hematuria  Integument:  negative for rash and pruritus  Hematologic:  negative for easy bruising and gum/nose bleeding  Musculoskel: negative for myalgias, arthralgias, back pain, muscle weakness, joint pain  Neurological:  negative for headaches, dizziness, vertigo, memory problems and gait   Behavl/Psych: negative for feelings of anxiety, depression, mood changes    Past Medical History:   Diagnosis Date    [address] [address]     Hypercholesterolemia     Umbilical hernia without obstruction and without gangrene 2/15/2017     History reviewed. No pertinent surgical history. Social History     Social History    Marital status: LEGALLY      Spouse name: N/A    Number of children: N/A    Years of education: N/A     Social History Main Topics    Smoking status: Current Every Day Smoker     Packs/day: 0.25    Smokeless tobacco: None    Alcohol use Yes    Drug use: No    Sexual activity: Yes     Partners: Female     Other Topics Concern    None     Social History Narrative     History reviewed. No pertinent family history. Current Outpatient Prescriptions   Medication Sig Dispense Refill    nicotine (NICODERM CQ) 21 mg/24 hr 1 Patch by TransDERmal route every twenty-four (24) hours.  Indications: NICOTINE DEPENDENCE      atorvastatin (LIPITOR) 40 mg tablet Take 1 Tab by mouth daily. 30 Tab 12    glipiZIDE (GLUCOTROL) 10 mg tablet TAKE 1 TABLET BY MOUTH TWICE DAILY 60 Tab 12    metFORMIN (GLUCOPHAGE) 1,000 mg tablet TAKE 1 TABLET BY MOUTH TWICE DAILY WITH MEALS 60 Tab 6    losartan (COZAAR) 25 mg tablet TAKE 1 TABLET BY MOUTH EVERY DAY 90 Tab 1    sitaGLIPtin (JANUVIA) 100 mg tablet Take 1 Tab by mouth daily. 30 Tab 12    sildenafil citrate (VIAGRA) 100 mg tablet Take 1 Tab by mouth as needed. 8 Tab 12     No Known Allergies    Objective:  Visit Vitals    /70    Pulse 94    Temp 97.7 °F (36.5 °C) (Oral)    Resp 16    Ht 5' 10\" (1.778 m)    Wt 240 lb (108.9 kg)    SpO2 98%    BMI 34.44 kg/m2     Physical Exam:   General appearance - alert, well appearing, and in no distress  Mental status - alert, oriented to person, place, and time  EYE-EMY, EOMI, corneas normal, no foreign bodies  ENT-ENT exam normal, no neck nodes or sinus tenderness  Nose - normal and patent, no erythema, discharge or polyps  Mouth - mucous membranes moist, pharynx normal without lesions  Neck - supple, no significant adenopathy   Chest - clear to auscultation, no wheezes, rales or rhonchi, symmetric air entry   Heart - normal rate, regular rhythm, normal S1, S2, no murmurs, rubs, clicks or gallops   Abdomen - soft, nontender, nondistended, no masses or organomegaly  Lymph- no adenopathy palpable  Ext-peripheral pulses normal, no pedal edema, no clubbing or cyanosis  Skin-Warm and dry.  no hyperpigmentation, vitiligo, or suspicious lesions  Neuro -alert, oriented, normal speech, no focal findings or movement disorder noted  Neck-normal C-spine, no tenderness, full ROM without pain  Feet-no nail deformities or callus formation with good pulses noted      Results for orders placed or performed during the hospital encounter of 03/02/17   GLUCOSE, POC   Result Value Ref Range    Glucose (POC) 198 (H) 65 - 100 mg/dL    Performed by Tremaine Watts        Assessment/Plan: ICD-10-CM ICD-9-CM    1. Type 2 diabetes mellitus without complication, without long-term current use of insulin (HCC) E11.9 250.00    2. Essential hypertension I10 401.9    3. Umbilical hernia without obstruction and without gangrene K42.9 553.1    4. Hypercholesteremia E78.00 272.0      No orders of the defined types were placed in this encounter. lose weight, increase physical activity, follow low fat diet, follow low salt diet,Take 81mg aspirin daily  There are no Patient Instructions on file for this visit. Follow-up Disposition: Not on File      I have reviewed with the patient details of the assessment and plan and all questions were answered. Relevent patient education was performed    An After Visit Summary was printed and given to the patient.

## 2017-03-20 NOTE — PATIENT INSTRUCTIONS
[email or web address] Activation    Thank you for requesting access to [email or web address]. Please follow the instructions below to securely access and download your online medical record. [email or web address] allows you to send messages to your doctor, view your test results, renew your prescriptions, schedule appointments, and more. How Do I Sign Up? 1. In your internet browser, go to [email or web address]  2. Click on the First Time User? Click Here link in the Sign In box. You will be redirect to the New Member Sign Up page. 3. Enter your [email or web address] Access Code exactly as it appears below. You will not need to use this code after youve completed the sign-up process. If you do not sign up before the expiration date, you must request a new code. [email or web address] Access Code: Activation code not generated  Current [email or web address] Status: Patient Declined (This is the date your [email or web address] access code will )    4. Enter the last four digits of your Social Security Number (xxxx) and Date of Birth (mm/dd/yyyy) as indicated and click Submit. You will be taken to the next sign-up page. 5. Create a [email or web address]@Me ID. This will be your [email or web address] login ID and cannot be changed, so think of one that is secure and easy to remember. 6. Create a [email or web address]@Me password. You can change your password at any time. 7. Enter your Password Reset Question and Answer. This can be used at a later time if you forget your password. 8. Enter your e-mail address. You will receive e-mail notification when new information is available in [address] 9. Click Sign Up. You can now view and download portions of your medical record. 10. Click the Download Summary menu link to download a portable copy of your medical information. Additional Information    If you have questions, please visit the Frequently Asked Questions section of the [email or web address] website at [email or web address] Remember, [email or web address] is NOT to be used for urgent needs. For medical emergencies, dial 911.

## 2017-03-24 ENCOUNTER — HOSPITAL ENCOUNTER (OUTPATIENT)
Dept: GENERAL RADIOLOGY | Age: 57
Discharge: HOME | End: 2017-03-24
Payer: COMMERCIAL

## 2017-03-24 ENCOUNTER — HOSPITAL ENCOUNTER (OUTPATIENT)
Dept: PREADMISSION TESTING | Age: 57
Discharge: HOME | End: 2017-03-24
Payer: COMMERCIAL

## 2017-03-24 VITALS
SYSTOLIC BLOOD PRESSURE: 138 MMHG | TEMPERATURE: 98.1 F | OXYGEN SATURATION: 97 % | WEIGHT: 241 LBS | DIASTOLIC BLOOD PRESSURE: 77 MMHG | BODY MASS INDEX: 34.5 KG/M2 | HEIGHT: 70 IN

## 2017-03-24 DIAGNOSIS — Z01.811 PRE-OP CHEST EXAM: ICD-10-CM

## 2017-03-24 LAB
ANION GAP BLD CALC-SCNC: 7 MMOL/L (ref 5–15)
BASOPHILS # BLD AUTO: 0.1 K/UL (ref 0–0.1)
BASOPHILS # BLD: 1 % (ref 0–1)
BUN SERPL-MCNC: 12 MG/DL (ref 6–20)
BUN/CREAT SERPL: 14 (ref 12–20)
CALCIUM SERPL-MCNC: 8.9 MG/DL (ref 8.5–10.1)
CHLORIDE SERPL-SCNC: 102 MMOL/L (ref 97–108)
CO2 SERPL-SCNC: 29 MMOL/L (ref 21–32)
CREAT SERPL-MCNC: 0.87 MG/DL (ref 0.7–1.3)
DIFFERENTIAL METHOD BLD: ABNORMAL
EOSINOPHIL # BLD: 0.2 K/UL (ref 0–0.4)
EOSINOPHIL NFR BLD: 2 % (ref 0–7)
ERYTHROCYTE [DISTWIDTH] IN BLOOD BY AUTOMATED COUNT: 14.5 % (ref 11.5–14.5)
GLUCOSE SERPL-MCNC: 224 MG/DL (ref 65–100)
HCT VFR BLD AUTO: 39.6 % (ref 36.6–50.3)
HGB BLD-MCNC: 12.2 G/DL (ref 12.1–17)
LYMPHOCYTES # BLD AUTO: 35 % (ref 12–49)
LYMPHOCYTES # BLD: 3 K/UL (ref 0.8–3.5)
MCH RBC QN AUTO: 22.3 PG (ref 26–34)
MCHC RBC AUTO-ENTMCNC: 30.8 G/DL (ref 30–36.5)
MCV RBC AUTO: 72.4 FL (ref 80–99)
MONOCYTES # BLD: 0.5 K/UL (ref 0–1)
MONOCYTES NFR BLD AUTO: 6 % (ref 5–13)
NEUTS SEG # BLD: 4.9 K/UL (ref 1.8–8)
NEUTS SEG NFR BLD AUTO: 56 % (ref 32–75)
PLATELET # BLD AUTO: 328 K/UL (ref 150–400)
POTASSIUM SERPL-SCNC: 4.5 MMOL/L (ref 3.5–5.1)
RBC # BLD AUTO: 5.47 M/UL (ref 4.1–5.7)
RBC MORPH BLD: ABNORMAL
SODIUM SERPL-SCNC: 138 MMOL/L (ref 136–145)
WBC # BLD AUTO: 8.7 K/UL (ref 4.1–11.1)

## 2017-03-24 PROCEDURE — 85025 COMPLETE CBC W/AUTO DIFF WBC: CPT | Performed by: SURGERY

## 2017-03-24 PROCEDURE — 36415 COLL VENOUS BLD VENIPUNCTURE: CPT | Performed by: SURGERY

## 2017-03-24 PROCEDURE — 80048 BASIC METABOLIC PNL TOTAL CA: CPT | Performed by: SURGERY

## 2017-03-24 PROCEDURE — 71020 XR CHEST PA LAT: CPT

## 2017-03-24 NOTE — PERIOP NOTES
PT WAS SCHEDULED FOR SURGERY AT [facility] ON 3/2/17; UNSUCCESSFUL INTUBATION ATTEMPT LEFT TWO LOWER CENTRAL INCISORS SIGNIFICANTLY LOOSENED. PT IS TO SEE HIS DENTIST 3/27/17, BUT WAS URGED TO CALL DENTIST TODAY AND GET IN TO SEE HIM ASAP TO DETERMINE TREATMENT FOR LOWER TEETH. INSTRUCTED PT TO INFORM  IF DENTAL EXTRACTIONS DONE. EMPHATICALLY ENCOURAGED X2 TO START PRESCRIBED Julius Keith.

## 2017-03-24 NOTE — PERIOP NOTES
EKG FAXED TO ANESTHESIA FOR REVIEW AND CONSULT. DR. Nika Cyr CONSULTED EKG OK, NO FURTHER ORDERS GIVEN.

## 2017-03-27 ENCOUNTER — TELEPHONE (OUTPATIENT)
Dept: SURGERY | Age: 57
End: 2017-03-27

## 2017-03-27 NOTE — TELEPHONE ENCOUNTER
----- Message from [facility] [address] [place] [address], RN sent at 3/27/2017 11:21 AM EDT -----  Regarding: TEETH  TIFF, ON Friday WHEN I INTERVIEWED , HE RELATED HIS EXPERIENCE AT [facility] WHERE HE SUFFERED A DIFFICULT INTUBATION AND SURGERY WAS CANCELLED. HE SHOWED ME THAT HIS TWO LOWER CENTRAL INCISORS WERE KNOCKED LOOSE THEN, HE STATES; BOTH TEETH ARE SIGNIFICANTLY LOOSE AS HE DEMONSTRATED THE DEGREE TO WHICH HE CAN MOVE THEM. HE HAD A DENTAL APPT SCHEDULED AFTER THE 3/29 SURGERY, BUT I ADVISED HIM TO CONSULT THE DENTIST THAT VERY AFTERNOON AND TO NOTIFY YOUR OFFICE IF THE TEETH WERE EXTRACTED.

## 2017-03-27 NOTE — PERIOP NOTES
NOTIFIED TIFF MURRIETA WITH  OF PT'S GLUCOSE LEVEL BEING 224 ON 3/24/17. I ALSO WROTE NOTE DESCRIBING THE STATE OF PT'S LOWER INCISORS (VERY LOOSE) AND THAT I ADVISED HIM AFTER CONSULTING AN ANESTHESIOLOGIST TO CONTACT HIS DENTIST THAT SAME AFTERNOON, AND TO NOTIFY 'S OFFICE IF EXTRACTIONS WERE PERFORMED.   PT AGREED TO DO SO.

## 2017-03-28 ENCOUNTER — ANESTHESIA EVENT (OUTPATIENT)
Dept: MEDSURG UNIT | Age: 57
End: 2017-03-28
Payer: COMMERCIAL

## 2017-03-28 ENCOUNTER — TELEPHONE (OUTPATIENT)
Dept: SURGERY | Age: 57
End: 2017-03-28

## 2017-03-28 NOTE — ANESTHESIA PREPROCEDURE EVALUATION
Anesthetic History     Increased risk of difficult airway          Review of Systems / Medical History  Patient summary reviewed, nursing notes reviewed and pertinent labs reviewed    Pulmonary          Smoker         Neuro/Psych              Cardiovascular  Within defined limits                     GI/Hepatic/Renal  Within defined limits              Endo/Other    Diabetes         Other Findings            Physical Exam    Airway  Mallampati: II  TM Distance: > 6 cm  Neck ROM: normal range of motion   Mouth opening: Normal     Cardiovascular  Regular rate and rhythm,  S1 and S2 normal,  no murmur, click, rub, or gallop             Dental    Dentition: Loose teeth     Pulmonary  Breath sounds clear to auscultation               Abdominal  GI exam deferred       Other Findings            Anesthetic Plan    ASA: 3  Anesthesia type: general          Induction: Intravenous  Anesthetic plan and risks discussed with: Patient

## 2017-03-28 NOTE — TELEPHONE ENCOUNTER
----- Message from [address] [place] [address], RN sent at 3/27/2017 11:17 AM EDT -----  Regarding: ABNORMAL LABS  TIFF, 'S GLUCOSE  ON 3/24/17. HE IS ON METFORMIN & GLIPIZIDE.

## 2017-03-29 ENCOUNTER — ANESTHESIA (OUTPATIENT)
Dept: MEDSURG UNIT | Age: 57
End: 2017-03-29
Payer: COMMERCIAL

## 2017-03-29 ENCOUNTER — HOSPITAL ENCOUNTER (OUTPATIENT)
Age: 57
Setting detail: OUTPATIENT SURGERY
Discharge: HOME | End: 2017-03-29
Attending: SURGERY | Admitting: SURGERY
Payer: COMMERCIAL

## 2017-03-29 VITALS
OXYGEN SATURATION: 91 % | TEMPERATURE: 97.8 F | WEIGHT: 241 LBS | RESPIRATION RATE: 14 BRPM | HEART RATE: 81 BPM | BODY MASS INDEX: 34.5 KG/M2 | HEIGHT: 70 IN | DIASTOLIC BLOOD PRESSURE: 92 MMHG | SYSTOLIC BLOOD PRESSURE: 170 MMHG

## 2017-03-29 LAB
GLUCOSE BLD STRIP.AUTO-MCNC: 171 MG/DL (ref 65–100)
GLUCOSE BLD STRIP.AUTO-MCNC: 229 MG/DL (ref 65–100)
SERVICE CMNT-IMP: ABNORMAL
SERVICE CMNT-IMP: ABNORMAL

## 2017-03-29 PROCEDURE — 74011250636 HC RX REV CODE- 250/636: Performed by: ANESTHESIOLOGY

## 2017-03-29 PROCEDURE — 77030031139 HC SUT VCRL2 J&J -A: Performed by: SURGERY

## 2017-03-29 PROCEDURE — 77030010507 HC ADH SKN DERMBND J&J -B: Performed by: SURGERY

## 2017-03-29 PROCEDURE — 74011000250 HC RX REV CODE- 250

## 2017-03-29 PROCEDURE — 77030002933 HC SUT MCRYL J&J -A: Performed by: SURGERY

## 2017-03-29 PROCEDURE — 77030018836 HC SOL IRR NACL ICUM -A: Performed by: SURGERY

## 2017-03-29 PROCEDURE — 74011000250 HC RX REV CODE- 250: Performed by: SURGERY

## 2017-03-29 PROCEDURE — 76210000035 HC AMBSU PH I REC 1 TO 1.5 HR: Performed by: SURGERY

## 2017-03-29 PROCEDURE — 76060000062 HC AMB SURG ANES 1 TO 1.5 HR: Performed by: SURGERY

## 2017-03-29 PROCEDURE — 76030000001 HC AMB SURG OR TIME 1 TO 1.5: Performed by: SURGERY

## 2017-03-29 PROCEDURE — 77030026438 HC STYL ET INTUB CARD -A: Performed by: ANESTHESIOLOGY

## 2017-03-29 PROCEDURE — 82962 GLUCOSE BLOOD TEST: CPT

## 2017-03-29 PROCEDURE — 74011250636 HC RX REV CODE- 250/636: Performed by: SURGERY

## 2017-03-29 PROCEDURE — 76210000046 HC AMBSU PH II REC FIRST 0.5 HR: Performed by: SURGERY

## 2017-03-29 PROCEDURE — 74011250636 HC RX REV CODE- 250/636

## 2017-03-29 PROCEDURE — 77030008684 HC TU ET CUF COVD -B: Performed by: ANESTHESIOLOGY

## 2017-03-29 PROCEDURE — 77030011640 HC PAD GRND REM COVD -A: Performed by: SURGERY

## 2017-03-29 PROCEDURE — C1781 MESH (IMPLANTABLE): HCPCS | Performed by: SURGERY

## 2017-03-29 PROCEDURE — 77030002966 HC SUT PDS J&J -A: Performed by: SURGERY

## 2017-03-29 RX ORDER — CEFAZOLIN SODIUM IN 0.9 % NACL 2 G/50 ML
2 INTRAVENOUS SOLUTION, PIGGYBACK (ML) INTRAVENOUS
Status: COMPLETED | OUTPATIENT
Start: 2017-03-29 | End: 2017-03-29

## 2017-03-29 RX ORDER — PROPOFOL 10 MG/ML
INJECTION, EMULSION INTRAVENOUS AS NEEDED
Status: DISCONTINUED | OUTPATIENT
Start: 2017-03-29 | End: 2017-03-29 | Stop reason: HOSPADM

## 2017-03-29 RX ORDER — LIDOCAINE HYDROCHLORIDE 20 MG/ML
INJECTION, SOLUTION EPIDURAL; INFILTRATION; INTRACAUDAL; PERINEURAL AS NEEDED
Status: DISCONTINUED | OUTPATIENT
Start: 2017-03-29 | End: 2017-03-29 | Stop reason: HOSPADM

## 2017-03-29 RX ORDER — SODIUM CHLORIDE 9 MG/ML
25 INJECTION, SOLUTION INTRAVENOUS CONTINUOUS
Status: DISCONTINUED | OUTPATIENT
Start: 2017-03-29 | End: 2017-03-29 | Stop reason: HOSPADM

## 2017-03-29 RX ORDER — ROPIVACAINE HYDROCHLORIDE 5 MG/ML
150 INJECTION, SOLUTION EPIDURAL; INFILTRATION; PERINEURAL AS NEEDED
Status: DISCONTINUED | OUTPATIENT
Start: 2017-03-29 | End: 2017-03-29 | Stop reason: HOSPADM

## 2017-03-29 RX ORDER — HYDROCODONE BITARTRATE AND ACETAMINOPHEN 5; 325 MG/1; MG/1
1 TABLET ORAL
Qty: 10 TAB | Refills: 0 | Status: SHIPPED | OUTPATIENT
Start: 2017-03-29

## 2017-03-29 RX ORDER — SODIUM CHLORIDE 0.9 % (FLUSH) 0.9 %
5-10 SYRINGE (ML) INJECTION AS NEEDED
Status: DISCONTINUED | OUTPATIENT
Start: 2017-03-29 | End: 2017-03-29 | Stop reason: HOSPADM

## 2017-03-29 RX ORDER — NEOSTIGMINE METHYLSULFATE 1 MG/ML
INJECTION INTRAVENOUS AS NEEDED
Status: DISCONTINUED | OUTPATIENT
Start: 2017-03-29 | End: 2017-03-29 | Stop reason: HOSPADM

## 2017-03-29 RX ORDER — DIPHENHYDRAMINE HYDROCHLORIDE 50 MG/ML
12.5 INJECTION, SOLUTION INTRAMUSCULAR; INTRAVENOUS AS NEEDED
Status: DISCONTINUED | OUTPATIENT
Start: 2017-03-29 | End: 2017-03-29 | Stop reason: HOSPADM

## 2017-03-29 RX ORDER — HYDROMORPHONE HYDROCHLORIDE 1 MG/ML
0.2 INJECTION, SOLUTION INTRAMUSCULAR; INTRAVENOUS; SUBCUTANEOUS
Status: ACTIVE | OUTPATIENT
Start: 2017-03-29 | End: 2017-03-29

## 2017-03-29 RX ORDER — FENTANYL CITRATE 50 UG/ML
50 INJECTION, SOLUTION INTRAMUSCULAR; INTRAVENOUS AS NEEDED
Status: DISCONTINUED | OUTPATIENT
Start: 2017-03-29 | End: 2017-03-29 | Stop reason: HOSPADM

## 2017-03-29 RX ORDER — SODIUM CHLORIDE, SODIUM LACTATE, POTASSIUM CHLORIDE, CALCIUM CHLORIDE 600; 310; 30; 20 MG/100ML; MG/100ML; MG/100ML; MG/100ML
1000 INJECTION, SOLUTION INTRAVENOUS CONTINUOUS
Status: DISCONTINUED | OUTPATIENT
Start: 2017-03-29 | End: 2017-03-29 | Stop reason: HOSPADM

## 2017-03-29 RX ORDER — OXYCODONE HYDROCHLORIDE 5 MG/1
5 TABLET ORAL AS NEEDED
Status: DISCONTINUED | OUTPATIENT
Start: 2017-03-29 | End: 2017-03-29 | Stop reason: HOSPADM

## 2017-03-29 RX ORDER — SODIUM CHLORIDE, SODIUM LACTATE, POTASSIUM CHLORIDE, CALCIUM CHLORIDE 600; 310; 30; 20 MG/100ML; MG/100ML; MG/100ML; MG/100ML
100 INJECTION, SOLUTION INTRAVENOUS CONTINUOUS
Status: DISCONTINUED | OUTPATIENT
Start: 2017-03-29 | End: 2017-03-29 | Stop reason: HOSPADM

## 2017-03-29 RX ORDER — SUCCINYLCHOLINE CHLORIDE 20 MG/ML
INJECTION INTRAMUSCULAR; INTRAVENOUS AS NEEDED
Status: DISCONTINUED | OUTPATIENT
Start: 2017-03-29 | End: 2017-03-29 | Stop reason: HOSPADM

## 2017-03-29 RX ORDER — BUPIVACAINE HYDROCHLORIDE 2.5 MG/ML
30 INJECTION, SOLUTION EPIDURAL; INFILTRATION; INTRACAUDAL ONCE
Status: COMPLETED | OUTPATIENT
Start: 2017-03-29 | End: 2017-03-29

## 2017-03-29 RX ORDER — MIDAZOLAM HYDROCHLORIDE 1 MG/ML
INJECTION, SOLUTION INTRAMUSCULAR; INTRAVENOUS AS NEEDED
Status: DISCONTINUED | OUTPATIENT
Start: 2017-03-29 | End: 2017-03-29 | Stop reason: HOSPADM

## 2017-03-29 RX ORDER — GLYCOPYRROLATE 0.2 MG/ML
INJECTION INTRAMUSCULAR; INTRAVENOUS AS NEEDED
Status: DISCONTINUED | OUTPATIENT
Start: 2017-03-29 | End: 2017-03-29 | Stop reason: HOSPADM

## 2017-03-29 RX ORDER — PHENYLEPHRINE HCL IN 0.9% NACL 0.4MG/10ML
SYRINGE (ML) INTRAVENOUS AS NEEDED
Status: DISCONTINUED | OUTPATIENT
Start: 2017-03-29 | End: 2017-03-29 | Stop reason: HOSPADM

## 2017-03-29 RX ORDER — LIDOCAINE HYDROCHLORIDE 10 MG/ML
0.1 INJECTION, SOLUTION EPIDURAL; INFILTRATION; INTRACAUDAL; PERINEURAL AS NEEDED
Status: DISCONTINUED | OUTPATIENT
Start: 2017-03-29 | End: 2017-03-29 | Stop reason: HOSPADM

## 2017-03-29 RX ORDER — FENTANYL CITRATE 50 UG/ML
INJECTION, SOLUTION INTRAMUSCULAR; INTRAVENOUS AS NEEDED
Status: DISCONTINUED | OUTPATIENT
Start: 2017-03-29 | End: 2017-03-29 | Stop reason: HOSPADM

## 2017-03-29 RX ORDER — FENTANYL CITRATE 50 UG/ML
25 INJECTION, SOLUTION INTRAMUSCULAR; INTRAVENOUS
Status: DISCONTINUED | OUTPATIENT
Start: 2017-03-29 | End: 2017-03-29 | Stop reason: HOSPADM

## 2017-03-29 RX ORDER — MIDAZOLAM HYDROCHLORIDE 1 MG/ML
1 INJECTION, SOLUTION INTRAMUSCULAR; INTRAVENOUS AS NEEDED
Status: DISCONTINUED | OUTPATIENT
Start: 2017-03-29 | End: 2017-03-29 | Stop reason: HOSPADM

## 2017-03-29 RX ORDER — KETOROLAC TROMETHAMINE 30 MG/ML
INJECTION, SOLUTION INTRAMUSCULAR; INTRAVENOUS AS NEEDED
Status: DISCONTINUED | OUTPATIENT
Start: 2017-03-29 | End: 2017-03-29 | Stop reason: HOSPADM

## 2017-03-29 RX ORDER — ROCURONIUM BROMIDE 10 MG/ML
INJECTION, SOLUTION INTRAVENOUS AS NEEDED
Status: DISCONTINUED | OUTPATIENT
Start: 2017-03-29 | End: 2017-03-29 | Stop reason: HOSPADM

## 2017-03-29 RX ORDER — SODIUM CHLORIDE 0.9 % (FLUSH) 0.9 %
5-10 SYRINGE (ML) INJECTION EVERY 8 HOURS
Status: DISCONTINUED | OUTPATIENT
Start: 2017-03-29 | End: 2017-03-29 | Stop reason: HOSPADM

## 2017-03-29 RX ORDER — MIDAZOLAM HYDROCHLORIDE 1 MG/ML
0.5 INJECTION, SOLUTION INTRAMUSCULAR; INTRAVENOUS
Status: DISCONTINUED | OUTPATIENT
Start: 2017-03-29 | End: 2017-03-29 | Stop reason: HOSPADM

## 2017-03-29 RX ORDER — ONDANSETRON 2 MG/ML
4 INJECTION INTRAMUSCULAR; INTRAVENOUS AS NEEDED
Status: DISCONTINUED | OUTPATIENT
Start: 2017-03-29 | End: 2017-03-29 | Stop reason: HOSPADM

## 2017-03-29 RX ORDER — MORPHINE SULFATE 10 MG/ML
2 INJECTION, SOLUTION INTRAMUSCULAR; INTRAVENOUS
Status: DISCONTINUED | OUTPATIENT
Start: 2017-03-29 | End: 2017-03-29 | Stop reason: HOSPADM

## 2017-03-29 RX ADMIN — MIDAZOLAM HYDROCHLORIDE 2 MG: 1 INJECTION, SOLUTION INTRAMUSCULAR; INTRAVENOUS at 07:56

## 2017-03-29 RX ADMIN — KETOROLAC TROMETHAMINE 30 MG: 30 INJECTION, SOLUTION INTRAMUSCULAR; INTRAVENOUS at 08:55

## 2017-03-29 RX ADMIN — Medication 80 MCG: at 08:54

## 2017-03-29 RX ADMIN — Medication 80 MCG: at 08:42

## 2017-03-29 RX ADMIN — Medication 80 MCG: at 08:31

## 2017-03-29 RX ADMIN — PROPOFOL 170 MG: 10 INJECTION, EMULSION INTRAVENOUS at 08:06

## 2017-03-29 RX ADMIN — Medication 80 MCG: at 08:56

## 2017-03-29 RX ADMIN — Medication 80 MCG: at 08:32

## 2017-03-29 RX ADMIN — ROCURONIUM BROMIDE 20 MG: 10 INJECTION, SOLUTION INTRAVENOUS at 08:10

## 2017-03-29 RX ADMIN — Medication 80 MCG: at 08:29

## 2017-03-29 RX ADMIN — SUCCINYLCHOLINE CHLORIDE 160 MG: 20 INJECTION INTRAMUSCULAR; INTRAVENOUS at 08:06

## 2017-03-29 RX ADMIN — PROPOFOL 30 MG: 10 INJECTION, EMULSION INTRAVENOUS at 08:16

## 2017-03-29 RX ADMIN — CEFAZOLIN 2 G: 1 INJECTION, POWDER, FOR SOLUTION INTRAMUSCULAR; INTRAVENOUS; PARENTERAL at 08:08

## 2017-03-29 RX ADMIN — GLYCOPYRROLATE 0.2 MG: 0.2 INJECTION INTRAMUSCULAR; INTRAVENOUS at 08:55

## 2017-03-29 RX ADMIN — HUMAN INSULIN 10 UNITS: 100 INJECTION, SOLUTION SUBCUTANEOUS at 07:19

## 2017-03-29 RX ADMIN — ROCURONIUM BROMIDE 5 MG: 10 INJECTION, SOLUTION INTRAVENOUS at 08:06

## 2017-03-29 RX ADMIN — LIDOCAINE HYDROCHLORIDE 60 MG: 20 INJECTION, SOLUTION EPIDURAL; INFILTRATION; INTRACAUDAL; PERINEURAL at 08:06

## 2017-03-29 RX ADMIN — Medication 80 MCG: at 08:47

## 2017-03-29 RX ADMIN — NEOSTIGMINE METHYLSULFATE 1 MG: 1 INJECTION INTRAVENOUS at 08:55

## 2017-03-29 RX ADMIN — FENTANYL CITRATE 50 MCG: 50 INJECTION, SOLUTION INTRAMUSCULAR; INTRAVENOUS at 08:16

## 2017-03-29 RX ADMIN — FENTANYL CITRATE 100 MCG: 50 INJECTION, SOLUTION INTRAMUSCULAR; INTRAVENOUS at 08:06

## 2017-03-29 RX ADMIN — SODIUM CHLORIDE, SODIUM LACTATE, POTASSIUM CHLORIDE, AND CALCIUM CHLORIDE 1000 ML: 600; 310; 30; 20 INJECTION, SOLUTION INTRAVENOUS at 07:06

## 2017-03-29 NOTE — BRIEF OP NOTE
BRIEF OPERATIVE NOTE    Date of Procedure: 3/29/2017   Preoperative Diagnosis:  Incarcerated Umbilical Hernia. Postoperative Diagnosis:  Same. Procedure(s):   Repair Incarcerated Umbilical Hernia. Surgeon(s) and Role:   Shi Watson MD - Primary  Surgical Staff:  Circ-1: Santhosh Arauz RN  Circ-Relief: Patrick Goldstein RN; Madnia Ivan RN  Registered Nurse Assistant: Boom Crowe RN  Scrub Tech-1: Claudell Overcast  Event Time In   Incision Start 6773   Incision Close 3577     Anesthesia: General   Estimated Blood Loss: Minimal.  Specimens: * No specimens in log *   Findings: Small hernia defect. Incarcerated pre-peritoneal fat. Complications: None.   Implants: * No implants in log *

## 2017-03-29 NOTE — ANESTHESIA POSTPROCEDURE EVALUATION
Post-Anesthesia Evaluation and Assessment    Patient: Chico Nicole MRN: [id number]  SSN: [id number]    YOB: 1960  Age: 61 y.o. Sex: male       Cardiovascular Function/Vital Signs  Visit Vitals    /71    Pulse 85    Temp 36.6 °C (97.8 °F)    Resp 10    Ht 5' 10\" (1.778 m)    Wt 109.3 kg (241 lb)    SpO2 92%    BMI 34.58 kg/m2       Patient is status post general anesthesia for Procedure(s): UMBILICAL HERNIA REPAIR. Nausea/Vomiting: None    Postoperative hydration reviewed and adequate. Pain:  Pain Scale 1: Numeric (0 - 10) (03/29/17 0911)  Pain Intensity 1: 0 (03/29/17 0911)   Managed    Neurological Status:   Neuro (WDL): Within Defined Limits (03/29/17 0911)   At baseline    Mental Status and Level of Consciousness: Arousable    Pulmonary Status:   O2 Device: Nasal cannula;Room air (03/29/17 0916)   Adequate oxygenation and airway patent    Complications related to anesthesia: None    Post-anesthesia assessment completed.  No concerns    Signed By: Charlene Nunez MD     March 29, 2017

## 2017-03-29 NOTE — H&P
No c/o today. Afebrile VSS  No intake or output data in the 24 hours ending 03/29/17 0750Exam: Cor: RRR. Lungs: Bilat BS, CTA. Abd: Soft, Non tender. No change in small ventral hernia. Labs:   Recent Results (from the past 12 hour(s))   GLUCOSE, POC    Collection Time: 03/29/17  7:03 AM   Result Value Ref Range    Glucose (POC) 229 (H) 65 - 100 mg/dL    Performed by June Leija    To OR for repair of umbilical hernia, possibly with mesh. Discussed procedure with him including risks of bleeding, infection, recurrence. He understands and wishes to proceed. Consent on chart.

## 2017-03-29 NOTE — IP AVS SNAPSHOT
[address] 
[phone] Patient: Krissy Beckman MRN: [id number] [place]I:0[birth year]26 You are allergic to the following No active allergies Recent Documentation Height Weight BMI Smoking Status 1.778 m 109.3 kg 34.58 kg/m2 Current Every Day Smoker Emergency Contacts Name Discharge Info Relation Home Work Mobile Connie Mcclelland  Spouse [3] [phone] Zohaib Art  Other Relative [6] [phone] About your hospitalization You were admitted on:  March 29, 2017 You last received care in the:  [facility] PACU You were discharged on:  March 29, 2017 Unit phone number:  [phone] Why you were hospitalized Your primary diagnosis was:  Not on File Providers Seen During Your Hospitalizations Provider Role Specialty Primary office phone Zelalem Betancur MD Attending Provider General Surgery [phone] Your Primary Care Physician (PCP) Primary Care Physician Office Phone Office Fax [address] [phone] Follow-up Information Follow up With Details Comments Contact Info Abdulaziz Hughes MD   [facility] [address] [place] [address] 
[phone] Your Appointments Tuesday April 04, 2017  8:15 AM EDT ROUTINE CARE with Abdulaziz Hughes MD  
[facility] Desiree Walters ([facility]) [address] [place] [address],[address] [facility] [id number]  
[phone] Thursday April 13, 2017  1:00 PM EDT  
POST OP 10 MIN with Zelalem Betancur MD  
[address] [place] [address] ([facility]) [address] [place] [phone] Current Discharge Medication List  
  
START taking these medications Dose & Instructions Dispensing Information Comments Morning Noon Evening Bedtime HYDROcodone-acetaminophen 5-325 mg per tablet Commonly known as:  Evens Bone Your last dose was: Your next dose is:    
   
   
 Dose:  1 Tab Take 1 Tab by mouth every four (4) hours as needed for Pain. Max Daily Amount: 6 Tabs. Quantity:  10 Tab Refills:  0 CONTINUE these medications which have NOT CHANGED Dose & Instructions Dispensing Information Comments Morning Noon Evening Bedtime  
 atorvastatin 40 mg tablet Commonly known as:  LIPITOR Your last dose was: Your next dose is:    
   
   
 Dose:  40 mg Take 1 Tab by mouth daily. Quantity:  30 Tab Refills:  12  
     
   
   
   
  
 glipiZIDE 10 mg tablet Commonly known as:  Anaya Culp Your last dose was: Your next dose is: TAKE 1 TABLET BY MOUTH TWICE DAILY Quantity:  60 Tab Refills:  12  
     
   
   
   
  
 losartan 25 mg tablet Commonly known as:  COZAAR Your last dose was: Your next dose is: TAKE 1 TABLET BY MOUTH EVERY DAY Quantity:  90 Tab Refills:  1  
     
   
   
   
  
 metFORMIN 1,000 mg tablet Commonly known as:  GLUCOPHAGE Your last dose was: Your next dose is: TAKE 1 TABLET BY MOUTH TWICE DAILY WITH MEALS Quantity:  60 Tab Refills:  6 NICODERM CQ 21 mg/24 hr  
Generic drug:  nicotine Your last dose was: Your next dose is:    
   
   
 Dose:  1 Patch 1 Patch by TransDERmal route every twenty-four (24) hours. Indications: NICOTINE DEPENDENCE Refills:  0  
     
   
   
   
  
 sildenafil citrate 100 mg tablet Commonly known as:  VIAGRA Your last dose was: Your next dose is:    
   
   
 Dose:  100 mg Take 1 Tab by mouth as needed. Quantity:  8 Tab Refills:  12 Where to Get Your Medications Information on where to get these meds will be given to you by the nurse or doctor. ! Ask your nurse or doctor about these medications HYDROcodone-acetaminophen 5-325 mg per tablet Discharge Instructions [facility] DISCHARGE SUMMARY NOTE DISCHARGE SUMMARY:  
 
Patient ID: 
Name: Terese Vogt Discharge Date: 3/29/2017 Time: 9:22 AM 
 
The following personal items collected during your admission are returned to you:  
Dental Appliance: Dental Appliances: Partials, Uppers (lower front teeth loose) Vision:   
Hearing Aid:   
Jewelry:   
Clothing: Clothing:  (clothes in locker) Other Valuables:   
Valuables sent to safe:   
 
 
PATIENT INSTRUCTIONS: 
 
What to do at Home: 
 
Recommended diet: Clear liquids, advance as tolerated, limit alcohol, Recommended activity: Activity as tolerated and no driving for today, 
 
Stroke risk factors: overweight, smoking, sedentary lifestyle Call 911 immediately if sudden numbness or weakness of an extremity is noted. Warning signs of a stroke include: 
 
· Sudden numbness or weakness of the face (is smile equal on both sides?) · Sudden numbness or weakness of the arm or leg, especially on one side of the body · Sudden confusion, trouble speaking or understanding. I have reviewed discharge instructions with the patient and caregiver. The patient and caregiver verbalized understanding. Patient Discharge Instructions Terese Vogt / [id number] : 1960 Admitted 3/29/2017 Discharged: 3/29/2017 · It is important that you take the medication exactly as they are prescribed. · Keep your medication in the bottles provided by the pharmacist and keep a list of the medication names, dosages, and times to be taken in your wallet. · Do not take other medications without consulting your doctor. What to do at [facility] Recommended diet: As Directed. Recommended activity: No Heavy Lifting (Less Than 10-15 Pounds). No Driving While Taking [address] [employer]. May Take Shower or [place] Roxo. If you experience any of the following symptoms Fevers, Chills, Nausea, Vomitting, Redness or Drainage at Surgical Site(s) or Any Other Questions or Concerns Please Call -  [phone]. Follow-up with Dr. Sonal Castrejon in 10-14 days. Information obtained by : 
I understand that if any problems occur once I am at home I am to contact my physician. I understand and acknowledge receipt of the instructions indicated above. Physician's or R.N.'s Signature                                                                  Date/Time Patient or Representative Signature                                                          Date/Time Discharge Orders None General Information Please provide this summary of care documentation to your next provider. Patient Signature:  ____________________________________________________________ Date:  ____________________________________________________________  
  
Rosalba [place] Provider Signature:  ____________________________________________________________ Date:  ____________________________________________________________

## 2017-03-29 NOTE — ROUTINE PROCESS
Patient: José Ware MRN: [id number]  SSN: [id number]   YOB: 1960  Age: 61 y.o. Sex: male     Patient is status post Procedure(s): UMBILICAL HERNIA REPAIR.     Surgeon(s) and Role:     * Titi De La Rosa MD - Primary    Local/Dose/Irrigation:  See mar                  Peripheral IV 03/29/17 Right Forearm (Active)   Dressing Status Clean, dry, & intact 3/29/2017  7:00 AM   Dressing Type Transparent 3/29/2017  7:00 AM            Airway - Endotracheal Tube 03/02/17 Oral (Active)       Airway - Endotracheal Tube 03/29/17 Oral (Active)                   Dressing/Packing:     Splint/Cast:  ]    Other:

## 2017-03-29 NOTE — DISCHARGE INSTRUCTIONS
[facility] DISCHARGE SUMMARY NOTE    DISCHARGE SUMMARY:     Patient ID:  Name: Mariya Calloway  Discharge Date: 3/29/2017  Time: 9:22 AM    The following personal items collected during your admission are returned to you:   Dental Appliance: Dental Appliances: Partials, Uppers (lower front teeth loose)  Vision:    Hearing Aid:    Jewelry:    Clothing: Clothing:  (clothes in locker)  Other Valuables:    Valuables sent to safe:        PATIENT INSTRUCTIONS:    What to do at Home:    Recommended diet: Clear liquids, advance as tolerated, limit alcohol,    Recommended activity: Activity as tolerated and no driving for today,    Stroke risk factors: overweight, smoking, sedentary lifestyle    Call 911 immediately if sudden numbness or weakness of an extremity is noted. Warning signs of a stroke include:    · Sudden numbness or weakness of the face (is smile equal on both sides?)  · Sudden numbness or weakness of the arm or leg, especially on one side of the body  · Sudden confusion, trouble speaking or understanding. I have reviewed discharge instructions with the patient and caregiver. The patient and caregiver verbalized understanding. Patient Discharge Instructions    Mariya Calloway / [id number] : 1960    Admitted 3/29/2017 Discharged: 3/29/2017       · It is important that you take the medication exactly as they are prescribed. · Keep your medication in the bottles provided by the pharmacist and keep a list of the medication names, dosages, and times to be taken in your wallet. · Do not take other medications without consulting your doctor. What to do at Home    Recommended diet: As Directed. Recommended activity: No Heavy Lifting (Less Than 10-15 Pounds). No Driving While Taking [address] [employer]. May Take Shower or [employer] Roxo.     If you experience any of the following symptoms Fevers, Chills, Nausea, Vomitting, Redness or Drainage at Surgical Site(s) or Any Other Questions or Concerns Please Call -  [phone]. Follow-up with Dr. Gracia Rojo in 10-14 days. Information obtained by :  I understand that if any problems occur once I am at home I am to contact my physician. I understand and acknowledge receipt of the instructions indicated above.                                                                                                                                            Physician's or R.N.'s Signature                                                                  Date/Time                                                                                                                                              Patient or Representative Signature                                                          Date/Time

## 2017-03-29 NOTE — OP NOTES
[facility] [address] [place] [address], [address]   OP NOTE       Name:  Chris Beal   MR#:  [id number]   :  1960   Account #:  [de-identified]    Surgery Date:  2017   Date of Adm:  2017       PREOPERATIVE DIAGNOSIS: Incarcerated umbilical hernia. POSTOPERATIVE DIAGNOSIS: Incarcerated umbilical hernia. PROCEDURE PERFORMED: Repair of incarcerated umbilical hernia. SURGEON: Keny Smith. Gracia Rojo MD     ASSISTANT SURGEON: Earlis Opitz, RN    ANESTHESIA: General endotracheal.    ESTIMATED BLOOD LOSS: Minimal.      CRYSTALLOID: 500 mL. SPECIMENS REMOVED: None. DRAINS: None. COMPLICATIONS: None. INDICATIONS FOR SURGERY: The patient is a 54-year-old man with   a symptomatic incarcerated umbilical hernia. The patient was brought   to the operating room at this time for open repair, possibly with mesh. The risks of the procedure, including but not limited to bleeding,   infection, and hernia recurrence were discussed in detail with the   patient. The patient understood and wished to proceed. DESCRIPTION OF PROCEDURE: After consent was obtained, the   patient was brought to the operating room where he was placed in the   supine position on the operating room table. Following the induction of   an adequate level of general anesthesia via endotracheal tube,   compression devices were placed on both lower extremities. The   abdomen was prepped with ChloraPrep and draped as a sterile field. Local anesthetic was infiltrated and a small midline incision above the   umbilicus opened sharply. Subcutaneous bleeders were carefully   cauterized. The incision was carried down to the hernia sac, which was   readily identified and carefully opened. Upon doing so, incarcerated   preperitoneal fat was encountered. This was mobilized out of the   hernia sac and reduced. Using the cautery, the hernia sac was   excised. The hernia defect was small, less than 1 x 1 cm. Therefore, it was   decided to not place mesh. Using a combination of blunt and sharp   dissection, the edges of the fascia were mobilized such that a primary   tension-free hernia repair could be completed. The wound was irrigated copiously with saline, inspected and found to   be hemostatic. The hernia defect was closed with 2 interrupted 0 Vicryl   figure-of-eight sutures. The fascia was imbricated over the hernia   repair with 2 additional 0 Vicryl figure-of-eight sutures. The wound was   irrigated again with saline, inspected and found to be hemostatic. The   surgical incision was closed with 2 layers of running 2-0 Vicryl suture   followed by 4-0 Monocryl subcuticular suture to the skin. Additional   local anesthetic was infiltrated and the incision dressed with   Dermabond. The patient was awakened from his general anesthetic   and extubated in the operating room. The patient was transferred to   the stretcher and brought to the recovery room in stable condition,   having tolerated the procedure well. At the conclusion of the   procedure, all sponge counts, instrument counts, and needle counts   were reported as correct x2.         Gege Garland MD      [facility]   D:  03/29/2017   09:08   T:  03/29/2017   10:22   Job #:  [id number]

## 2017-03-30 ENCOUNTER — TELEPHONE (OUTPATIENT)
Dept: SURGERY | Age: 57
End: 2017-03-30

## 2017-03-30 ENCOUNTER — PATIENT OUTREACH (OUTPATIENT)
Dept: INTERNAL MEDICINE CLINIC | Age: 57
End: 2017-03-30

## 2017-03-30 NOTE — PROGRESS NOTES
[facility] Discharge Follow-Up      Date/Time:  3/30/2017 10:39 AM    Patient listed on discharge [facility]) report on . Patient discharged from [facility] for s/p elective umbilical hernia repair. RRAT score: not available   [facility] record has been reviewed. Nurse Navigator(NN) contacted the patient by telephone to perform transition of care assessment. Verified  and address with patient as identifiers. Provided introduction to self, and explanation of the Nurses Navigator role. The patient reports his surgical site pain as minimal. He has not taken any pain medication since hospital discharge. He reports that has had no difficulties with eating or elimination. Activity:    Patient reports: mostly moving around the house today. Medication:   Performed medication reconciliation with patient, and patient verbalizes understanding of administration of home medications. There were no barriers to obtaining medications identified at this time. Discharge Instructions :  Reviewed discharge instructions with patient. Patient verbalizes understanding of discharge instructions and follow-up care. Red Flags:  Sensation of fever of self or at surgical site. Excessive drainage or bleeding from the surgical site     PCP/Specialist follow up: Patient scheduled to follow up with Dr. Gino Julien on [id number]. Reviewed red flags with patient, and patient verbalizes understanding. Patient given an opportunity to ask questions. No other clinical/social/functional needs noted. The patient agrees to contact the PCP office for questions related to their healthcare. The patient expressed thanks, offered no additional questions and ended the call. Case management Impresssion/plan:   The patient has a good understanding with his post -op instructions and limitations. He would benfit from better glycemic control.  Will plan to discuss his diabetic management during the next contact. Will attempt to contact the patient by telephone or during office visit within the next 7-10 days. Will continue to follow as necessary for the next 30 days. Will reassess for case management needs prior to discharge from case management service on or about 30 days.

## 2017-03-30 NOTE — LETTER
NOTIFICATION OF RETURN TO WORK / SCHOOL 
 
3/30/2017 1:34 PM 
 
Mr. Daniel Ch[facility]vez [address] [facility] [phone] Delmis Rodriguez To Whom It May Concern: 
 
Daniel Chávez was under the care of [facility] from 3/29/17 to present. He will be able to return to work/school on 4/2/17 with restrictions no heavy lifting, pushing, or pulling over 10-15 lbs. If there are questions or concerns please have the patient contact our office. Sincerely, Gaudencio Jimenez MD

## 2017-04-04 ENCOUNTER — OFFICE VISIT (OUTPATIENT)
Dept: INTERNAL MEDICINE CLINIC | Age: 57
End: 2017-04-04

## 2017-04-04 VITALS
HEART RATE: 86 BPM | BODY MASS INDEX: 34.93 KG/M2 | SYSTOLIC BLOOD PRESSURE: 130 MMHG | RESPIRATION RATE: 19 BRPM | TEMPERATURE: 98.5 F | WEIGHT: 244 LBS | HEIGHT: 70 IN | OXYGEN SATURATION: 96 % | DIASTOLIC BLOOD PRESSURE: 70 MMHG

## 2017-04-04 DIAGNOSIS — E11.9 TYPE 2 DIABETES MELLITUS WITHOUT COMPLICATION, WITHOUT LONG-TERM CURRENT USE OF INSULIN (HCC): ICD-10-CM

## 2017-04-04 DIAGNOSIS — I10 ESSENTIAL HYPERTENSION: ICD-10-CM

## 2017-04-04 DIAGNOSIS — Z12.11 SCREENING FOR COLON CANCER: Primary | ICD-10-CM

## 2017-04-04 LAB
GLUCOSE POC: 283 MG/DL
HBA1C MFR BLD HPLC: 11.5 %

## 2017-04-04 RX ORDER — PIOGLITAZONEHYDROCHLORIDE 45 MG/1
45 TABLET ORAL
Qty: 30 TAB | Refills: 12 | Status: SHIPPED | OUTPATIENT
Start: 2017-04-04

## 2017-04-04 NOTE — MR AVS SNAPSHOT
Visit Information Date & Time Provider Department Dept. Phone Encounter #  
 4/4/2017  8:15 AM Buster Kovacs MD [facility] [phone] [id number] Follow-up Instructions Return in about 3 months (around 7/4/2017), or if symptoms worsen or fail to improve. Your Appointments 4/13/2017  1:00 PM  
POST OP 10 MIN with Lady Donna MD  
[address] [facility] ([facility]) Appt Note: [id number] Umbilical Hernia, Possible Mesh. po  
 [address] [facility] [address] [address]  
[phone]  
  
   
 [address]  
  
    
 6/20/2017  8:15 AM  
ROUTINE CARE with Buster Kovacs MD  
[facility] - [facility] ([address] [facility]) Appt Note: 3 month check up [address] [phone]  
[phone]  
  
   
 [address] [place] [phone] Upcoming Health Maintenance Date Due  
 EYE EXAM RETINAL OR DILATED Q1 6/2/1969 Pneumococcal 19-64 Medium Risk (1 of 1 - PPSV23) 6/2/1978 GLAUCOMA SCREENING Q2Y 6/2/2009 FOBT Q 1 YEAR AGE 50-75 9/29/2015 DTaP/Tdap/Td series (1 - Tdap) 10/26/2017* HEMOGLOBIN A1C Q6M 7/3/2017 MICROALBUMIN Q1 8/22/2017 FOOT EXAM Q1 1/3/2018 LIPID PANEL Q1 1/3/2018 *Topic was postponed. The date shown is not the original due date. Allergies as of 4/4/2017  Review Complete On: 4/4/2017 By: Buster Kovacs MD  
 No Known Allergies Current Immunizations  Never Reviewed No immunizations on file. Not reviewed this visit You Were Diagnosed With   
  
 Codes Comments Screening for colon cancer    -  Primary ICD-10-CM: Z12.11 ICD-9-CM: V76.51 Type 2 diabetes mellitus without complication, without long-term current use of insulin (HCC)     ICD-10-CM: E11.9 ICD-9-CM: 250.00 Essential hypertension     ICD-10-CM: I10 
ICD-9-CM: 401.9 Vitals BP Pulse Temp Resp Height(growth percentile) Weight(growth percentile) 130/70 86 98.5 °F (36.9 °C) (Oral) 19 5' 10\" (1.778 m) 244 lb (110.7 kg) SpO2 BMI Smoking Status 96% 35.01 kg/m2 Current Every Day Smoker BMI and BSA Data Body Mass Index Body Surface Area 35.01 kg/m 2 2.34 m 2 Preferred Pharmacy Pharmacy Name Phone [id number]- [place], [address] Myla Singh [phone] Your Updated Medication List  
  
   
This list is accurate as of: 4/4/17  8:46 AM.  Always use your most recent med list.  
  
  
  
  
 atorvastatin 40 mg tablet Commonly known as:  LIPITOR Take 1 Tab by mouth daily. glipiZIDE 10 mg tablet Commonly known as:  GLUCOTROL  
TAKE 1 TABLET BY MOUTH TWICE DAILY HYDROcodone-acetaminophen 5-325 mg per tablet Commonly known as:  Mirella Proper Take 1 Tab by mouth every four (4) hours as needed for Pain. Max Daily Amount: 6 Tabs. losartan 25 mg tablet Commonly known as:  COZAAR  
TAKE 1 TABLET BY MOUTH EVERY DAY  
  
 metFORMIN 1,000 mg tablet Commonly known as:  GLUCOPHAGE  
TAKE 1 TABLET BY MOUTH TWICE DAILY WITH MEALS  
  
 NICODERM CQ 21 mg/24 hr  
Generic drug:  nicotine 1 Patch by TransDERmal route every twenty-four (24) hours. Indications: NICOTINE DEPENDENCE  
  
 pioglitazone 45 mg tablet Commonly known as:  ACTOS Take 1 Tab by mouth nightly. sildenafil citrate 100 mg tablet Commonly known as:  VIAGRA Take 1 Tab by mouth as needed. Prescriptions Sent to Pharmacy Refills  
 pioglitazone (ACTOS) 45 mg tablet 12 Sig: Take 1 Tab by mouth nightly. Class: Normal  
 Pharmacy: [facility]/pharmacy [address] [place] [address] Ph #: [phone] Route: Oral  
  
We Performed the Following AMB POC GLUCOSE BLOOD, BY GLUCOSE MONITORING DEVICE [89292 CPT(R)] AMB POC HEMOGLOBIN A1C [05386 CPT(R)] LIPID PANEL [50555 CPT(R)] OCCULT BLOOD, IMMUNOASSAY (FIT) [id number] CPT(R)] Follow-up Instructions Return in about 3 months (around 2017), or if symptoms worsen or fail to improve. Patient Instructions Cuculushart Activation Thank you for requesting access to [employer]. Please follow the instructions below to securely access and download your online medical record. Dunwello allows you to send messages to your doctor, view your test results, renew your prescriptions, schedule appointments, and more. How Do I Sign Up? 1. In your internet browser, go to [email or web address] 
2. Click on the First Time User? Click Here link in the Sign In box. You will be redirect to the New Member Sign Up page. 3. Enter your Dunwello Access Code exactly as it appears below. You will not need to use this code after youve completed the sign-up process. If you do not sign up before the expiration date, you must request a new code. Dunwello Access Code: Activation code not generated Current Dunwello Status: Patient Declined (This is the date your Dunwello access code will ) 4. Enter the last four digits of your Social Security Number (xxxx) and Date of Birth (mm/dd/yyyy) as indicated and click Submit. You will be taken to the next sign-up page. 5. Create a Dunwello ID. This will be your Dunwello login ID and cannot be changed, so think of one that is secure and easy to remember. 6. Create a Dunwello password. You can change your password at any time. 7. Enter your Password Reset Question and Answer. This can be used at a later time if you forget your password. 8. Enter your e-mail address. You will receive e-mail notification when new information is available in [address] 9. Click Sign Up. You can now view and download portions of your medical record. 10. Click the Download Summary menu link to download a portable copy of your medical information. Additional Information If you have questions, please visit the Frequently Asked Questions section of the SPEEDELOhart website at [email or web address] Remember, [facility] [employer] is NOT to be used for urgent needs. For medical emergencies, dial 911. Please provide this summary of care documentation to your next provider. Your primary care clinician is listed as Lord Kenney. If you have any questions after today's visit, please call [phone].

## 2017-04-04 NOTE — PATIENT INSTRUCTIONS
[employer] Activation    Thank you for requesting access to [employer]. Please follow the instructions below to securely access and download your online medical record. [employer] allows you to send messages to your doctor, view your test results, renew your prescriptions, schedule appointments, and more. How Do I Sign Up? 1. In your internet browser, go to [email or web address]  2. Click on the First Time User? Click Here link in the Sign In box. You will be redirect to the New Member Sign Up page. 3. Enter your [employer] Access Code exactly as it appears below. You will not need to use this code after youve completed the sign-up process. If you do not sign up before the expiration date, you must request a new code. [employer] Access Code: Activation code not generated  Current The [employer] Status: Patient Declined (This is the date your [employer] access code will )    4. Enter the last four digits of your Social Security Number (xxxx) and Date of Birth (mm/dd/yyyy) as indicated and click Submit. You will be taken to the next sign-up page. 5. Create a [employer] ID. This will be your [employer] login ID and cannot be changed, so think of one that is secure and easy to remember. 6. Create a [employer] password. You can change your password at any time. 7. Enter your Password Reset Question and Answer. This can be used at a later time if you forget your password. 8. Enter your e-mail address. You will receive e-mail notification when new information is available in [address] 9. Click Sign Up. You can now view and download portions of your medical record. 10. Click the Download Summary menu link to download a portable copy of your medical information. Additional Information    If you have questions, please visit the Frequently Asked Questions section of the [employer] Club website at [email or web address] Remember, [employer] is NOT to be used for urgent needs. For medical emergencies, dial 911.

## 2017-04-04 NOTE — PROGRESS NOTES
Lois Child is a 61 y.o. male and presents with Diabetes and Hypertension  . Subjective:    Health maintenance suggests the needs for a test for occult blood    Hypertension Review:  The patient has hypertension . Diet and Lifestyle: generally follows a low sodium diet, exercises sporadically  Home BP Monitoring: is not measured at home. Pertinent ROS: taking medications as instructed, no medication side effects noted, no TIA's, no chest pain on exertion, no dyspnea on exertion, no swelling of ankles. Dyslipidemia Review:  Patient presents for evaluation of lipids. Compliance with treatment thus far has been excellent. A repeat fasting lipid profile was done. The patient does not use medications that may worsen dyslipidemias . The patient exercises sporadically. The patient is not known to have coexisting coronary artery disease    Diabetes Mellitus Review:  He has diabetes mellitus. Diabetic ROS - medication compliance: compliant all of the time, diabetic diet compliance: compliant all of the time, home glucose monitoring: is performed. Known diabetic complications: none  Cardiovascular risk factors: family history, dyslipidemia, diabetes mellitus, obesity, hypertension  Current diabetic medications include oral agents/   Eye exam current (within one year): no  Weight trend: stable  Prior visit with dietician: no  Current diet: \"healthy\" diet  in general  Current exercise: walking  Current monitoring regimen: home blood tests - daily  Home blood sugar records: trend: stable  Any episodes of hypoglycemia? no  Is He on ACE inhibitor or angiotensin II receptor blocker? Yes   Lab review: orders written for new lab studies as appropriate; see orders.      He has completed his hernia surgery and tolerated it well      Review of Systems  Constitutional: negative for fevers, chills, anorexia and weight loss  Eyes:   negative for visual disturbance and irritation  ENT:   negative for tinnitus,sore throat,nasal congestion,ear pains. hoarseness  Respiratory:  negative for cough, hemoptysis, dyspnea,wheezing  CV:   negative for chest pain, palpitations, lower extremity edema  GI:   negative for nausea, vomiting, diarrhea, abdominal pain,melena  Endo:               negative for polyuria,polydipsia,polyphagia,heat intolerance  Genitourinary: negative for frequency, dysuria and hematuria  Integument:  negative for rash and pruritus  Hematologic:  negative for easy bruising and gum/nose bleeding  Musculoskel: negative for myalgias, arthralgias, back pain, muscle weakness, joint pain  Neurological:  negative for headaches, dizziness, vertigo, memory problems and gait   Behavl/Psych: negative for feelings of anxiety, depression, mood changes    Past Medical History:   Diagnosis Date    Adverse effect of anesthesia     SEE ANESTHESIA NOTE FROM [facility] 3/2/17 RE: DIFFICULT INTUBATION; TWO LOWER INCISORS WERE SIGNIFICANTLY LOOSENED.  Diabetes ([facility] [address]     Difficult intubation     PER ANESTHESIA NOTE OF 3/2/17    Hypercholesterolemia     Ill-defined condition 03/24/2017    GIVEN Z-AMANDEEP 3/23/17-PT UNCLEAR AS TO REASON BUT SOUNDS CONGESTED/COUGH. ENCOURAGED TO START Rx NOW.     Umbilical hernia without obstruction and without gangrene 2/15/2017     Past Surgical History:   Procedure Laterality Date    ABDOMEN SURGERY PROC UNLISTED  03/02/2017    ATTEMPTED INTUBATION TO REPAIR UMBILICAL HERNIA-ABORTED SURGERY    HX HEENT  AGE 14    LENS CRUSHED R EYE; EYE REPLACED    HX OTHER SURGICAL      DENTAL EXTRACTIONS     Social History     Social History    Marital status: LEGALLY      Spouse name: N/A    Number of children: N/A    Years of education: N/A     Social History Main Topics    Smoking status: Current Every Day Smoker     Packs/day: 0.25     Years: 40.00    Smokeless tobacco: None    Alcohol use Yes      Comment: OCCASIONAL    Drug use: No    Sexual activity: Yes     Partners: Female Other Topics Concern    None     Social History Narrative     Family History   Problem Relation Age of Onset    Cancer Father      LEUKEMIA    Anesth Problems Neg Hx      Current Outpatient Prescriptions   Medication Sig Dispense Refill    pioglitazone (ACTOS) 45 mg tablet Take 1 Tab by mouth nightly. 30 Tab 12    nicotine (NICODERM CQ) 21 mg/24 hr 1 Patch by TransDERmal route every twenty-four (24) hours. Indications: NICOTINE DEPENDENCE      atorvastatin (LIPITOR) 40 mg tablet Take 1 Tab by mouth daily. 30 Tab 12    glipiZIDE (GLUCOTROL) 10 mg tablet TAKE 1 TABLET BY MOUTH TWICE DAILY 60 Tab 12    metFORMIN (GLUCOPHAGE) 1,000 mg tablet TAKE 1 TABLET BY MOUTH TWICE DAILY WITH MEALS 60 Tab 6    losartan (COZAAR) 25 mg tablet TAKE 1 TABLET BY MOUTH EVERY DAY 90 Tab 1    HYDROcodone-acetaminophen (NORCO) 5-325 mg per tablet Take 1 Tab by mouth every four (4) hours as needed for Pain. Max Daily Amount: 6 Tabs. 10 Tab 0    sildenafil citrate (VIAGRA) 100 mg tablet Take 1 Tab by mouth as needed.  8 Tab 12     No Known Allergies    Objective:  Visit Vitals    /70    Pulse 86    Temp 98.5 °F (36.9 °C) (Oral)    Resp 19    Ht 5' 10\" (1.778 m)    Wt 244 lb (110.7 kg)    SpO2 96%    BMI 35.01 kg/m2     Physical Exam:   General appearance - alert, well appearing, and in no distress  Mental status - alert, oriented to person, place, and time  EYE-EMY, EOMI, corneas normal, no foreign bodies  ENT-ENT exam normal, no neck nodes or sinus tenderness  Nose - normal and patent, no erythema, discharge or polyps  Mouth - mucous membranes moist, pharynx normal without lesions  Neck - supple, no significant adenopathy   Chest - clear to auscultation, no wheezes, rales or rhonchi, symmetric air entry   Heart - normal rate, regular rhythm, normal S1, S2, no murmurs, rubs, clicks or gallops   Abdomen - soft, nontender, nondistended, no masses or organomegaly  Lymph- no adenopathy palpable  Ext-peripheral pulses normal, no pedal edema, no clubbing or cyanosis  Skin-Warm and dry. no hyperpigmentation, vitiligo, or suspicious lesions  Neuro -alert, oriented, normal speech, no focal findings or movement disorder noted  Neck-normal C-spine, no tenderness, full ROM without pain  Feet-no nail deformities or callus formation with good pulses noted      Results for orders placed or performed in visit on 04/04/17   AMB POC GLUCOSE BLOOD, BY GLUCOSE MONITORING DEVICE   Result Value Ref Range    Glucose  mg/dL   AMB POC HEMOGLOBIN A1C   Result Value Ref Range    Hemoglobin A1c (POC) 11.5 %       Assessment/Plan:    ICD-10-CM ICD-9-CM    1. Screening for colon cancer Z12.11 V76.51 OCCULT BLOOD, IMMUNOASSAY (FIT)      CANCELED: AMB POC LIPID PROFILE   2. Type 2 diabetes mellitus without complication, without long-term current use of insulin (HCC) E11.9 250.00 OCCULT BLOOD, IMMUNOASSAY (FIT)      AMB POC GLUCOSE BLOOD, BY GLUCOSE MONITORING DEVICE      AMB POC HEMOGLOBIN A1C      pioglitazone (ACTOS) 45 mg tablet      CANCELED: AMB POC LIPID PROFILE   3. Essential hypertension I10 401.9 OCCULT BLOOD, IMMUNOASSAY (FIT)      AMB POC GLUCOSE BLOOD, BY GLUCOSE MONITORING DEVICE      AMB POC HEMOGLOBIN A1C      LIPID PANEL      CANCELED: AMB POC LIPID PROFILE     Orders Placed This Encounter    OCCULT BLOOD, IMMUNOASSAY (FIT)    LIPID PANEL    AMB POC GLUCOSE BLOOD, BY GLUCOSE MONITORING DEVICE    AMB POC HEMOGLOBIN A1C    pioglitazone (ACTOS) 45 mg tablet     Sig: Take 1 Tab by mouth nightly. Dispense:  30 Tab     Refill:  12     lose weight, increase physical activity, follow low fat diet, follow low salt diet,Take 81mg aspirin daily  Patient Instructions   [employer] Activation    Thank you for requesting access to [employer]. Please follow the instructions below to securely access and download your online medical record.  Reading [facility]ils allows you to send messages to your doctor, view your test results, renew your prescriptions, schedule appointments, and more. How Do I Sign Up? 1. In your internet browser, go to [email or web address]. [email or web address] Environmental  2. Click on the First Time User? Click Here link in the Sign In box. You will be redirect to the New Member Sign Up page. 3. Enter your Flashtalking Access Code exactly as it appears below. You will not need to use this code after youve completed the sign-up process. If you do not sign up before the expiration date, you must request a new code. [email or web address] Access Code: Activation code not generated  Current Flashtalking Status: Patient Declined (This is the date your [email or web address] access code will )    4. Enter the last four digits of your Social Security Number (xxxx) and Date of Birth (mm/dd/yyyy) as indicated and click Submit. You will be taken to the next sign-up page. 5. Create a Flashtalking ID. This will be your Flashtalking login ID and cannot be changed, so think of one that is secure and easy to remember. 6. Create a Flashtalking password. You can change your password at any time. 7. Enter your Password Reset Question and Answer. This can be used at a later time if you forget your password. 8. Enter your e-mail address. You will receive e-mail notification when new information is available in [address] 9. Click Sign Up. You can now view and download portions of your medical record. 10. Click the Download Summary menu link to download a portable copy of your medical information. Additional Information    If you have questions, please visit the Frequently Asked Questions section of the Flashtalking website at [email or web address] Remember, Flashtalking is NOT to be used for urgent needs. For medical emergencies, dial 911. Follow-up Disposition:  Return in about 3 months (around 2017), or if symptoms worsen or fail to improve. I have reviewed with the patient details of the assessment and plan and all questions were answered.  Relevent patient education was performed    An After Visit Summary was printed and given to the patient.

## 2017-04-05 LAB
CHOLEST SERPL-MCNC: 131 MG/DL (ref 100–199)
HDLC SERPL-MCNC: 33 MG/DL
INTERPRETATION, 910389: NORMAL
LDLC SERPL CALC-MCNC: 78 MG/DL (ref 0–99)
TRIGL SERPL-MCNC: 99 MG/DL (ref 0–149)
VLDLC SERPL CALC-MCNC: 20 MG/DL (ref 5–40)

## 2017-04-16 LAB — HEMOCCULT STL QL IA: NEGATIVE

## 2017-04-18 ENCOUNTER — OFFICE VISIT (OUTPATIENT)
Dept: SURGERY | Age: 57
End: 2017-04-18

## 2017-04-18 DIAGNOSIS — Z09 S/P UMBILICAL HERNIA REPAIR, FOLLOW-UP EXAM: Primary | ICD-10-CM

## 2017-04-18 PROBLEM — K42.9 UMBILICAL HERNIA WITHOUT OBSTRUCTION AND WITHOUT GANGRENE: Status: RESOLVED | Noted: 2017-02-15 | Resolved: 2017-04-18

## 2017-04-18 NOTE — PROGRESS NOTES
HISTORY OF PRESENT ILLNESS  Prosper Camejo is a 61 y.o. male who returns for follow up. HPI Comments: Mr. Earnestine Riggs is s/p repair of an incarcerated umbilical hernia on 7/95/8513. Discharged to home that day. Doing well since then. No complaints today. Review of systems negative except as noted. Review of Systems   Constitutional: Negative for chills and fever. Gastrointestinal: Negative for abdominal pain, nausea and vomiting. Physical Exam   Constitutional: He appears well-developed and well-nourished. No distress. Abdominal: Soft. He exhibits no distension. There is no tenderness. There is no rebound and no guarding. No hernia. Hernia confirmed negative in the ventral area (No recurrence. ). Musculoskeletal: Normal range of motion. Neurological: He is alert. Skin:   Abdominal incision is clean and well healed. Vitals reviewed. ASSESSMENT and PLAN  Reviewed operative findings with Mr. Mcclelland today and reassured him that he is doing well thus far. Asked him to refrain from heavy lifting or physical exertion for another 2 weeks. Follow up with Dr. Saul Oliver as scheduled. Will see as needed.     CC: Marline Bacon., MD

## 2017-04-18 NOTE — MR AVS SNAPSHOT
Visit Information Date & Time Provider Department Dept. Phone Encounter #  
 4/18/2017  3:40 PM Radha Perez MD [facility] 683 [phone] [id number] Follow-up Instructions Return for Concerns. Alexandria Villafana Follow-up and Disposition History Your Appointments 6/20/2017  8:15 AM  
ROUTINE CARE with Sheryle Files., MD  
[facility] - [address] [place] [address] ([facility] Appt Note: 3 month check up [facility] [place] [id number]  
[phone]  
  
   
 [address] [id number] Upcoming Health Maintenance Date Due  
 EYE EXAM RETINAL OR DILATED Q1 6/2/1969 Pneumococcal 19-64 Medium Risk (1 of 1 - PPSV23) 6/2/1978 GLAUCOMA SCREENING Q2Y 6/2/2009 DTaP/Tdap/Td series (1 - Tdap) 10/26/2017* MICROALBUMIN Q1 8/22/2017 HEMOGLOBIN A1C Q6M 10/4/2017 FOOT EXAM Q1 1/3/2018 LIPID PANEL Q1 4/4/2018 FOBT Q 1 YEAR AGE 50-75 4/10/2018 *Topic was postponed. The date shown is not the original due date. Allergies as of 4/18/2017  Review Complete On: 4/18/2017 By: Radha Perez MD  
 No Known Allergies Current Immunizations  Never Reviewed No immunizations on file. Not reviewed this visit You Were Diagnosed With   
  
 Codes Comments S/P umbilical hernia repair, follow-up exam    -  Primary ICD-10-CM: C46 ICD-9-CM: V67.09 Vitals Smoking Status Current Every Day Smoker Preferred Pharmacy Pharmacy Name Phone [id number] [place], [address] [place] [phone] Your Updated Medication List  
  
   
This list is accurate as of: 4/18/17  4:28 PM.  Always use your most recent med list.  
  
  
  
  
 atorvastatin 40 mg tablet Commonly known as:  LIPITOR Take 1 Tab by mouth daily. glipiZIDE 10 mg tablet Commonly known as:  GLUCOTROL  
TAKE 1 TABLET BY MOUTH TWICE DAILY HYDROcodone-acetaminophen 5-325 mg per tablet Commonly known as:  Radha Rook Take 1 Tab by mouth every four (4) hours as needed for Pain. Max Daily Amount: 6 Tabs. losartan 25 mg tablet Commonly known as:  COZAAR  
TAKE 1 TABLET BY MOUTH EVERY DAY  
  
 metFORMIN 1,000 mg tablet Commonly known as:  GLUCOPHAGE  
TAKE 1 TABLET BY MOUTH TWICE DAILY WITH MEALS  
  
 NICODERM CQ 21 mg/24 hr  
Generic drug:  nicotine 1 Patch by TransDERmal route every twenty-four (24) hours. Indications: NICOTINE DEPENDENCE  
  
 pioglitazone 45 mg tablet Commonly known as:  ACTOS Take 1 Tab by mouth nightly. sildenafil citrate 100 mg tablet Commonly known as:  VIAGRA Take 1 Tab by mouth as needed. Follow-up Instructions Return for Concerns. Filomena Padron Please provide this summary of care documentation to your next provider. Your primary care clinician is listed as Libby Anguiano. If you have any questions after today's visit, please call [phone].

## 2017-07-28 RX ORDER — LOSARTAN POTASSIUM 25 MG/1
TABLET ORAL
Qty: 90 TAB | Refills: 0 | Status: SHIPPED | OUTPATIENT
Start: 2017-07-28 | End: 2017-12-26 | Stop reason: SDUPTHER

## 2017-12-18 RX ORDER — METFORMIN HYDROCHLORIDE 1000 MG/1
TABLET ORAL
Qty: 60 TAB | Refills: 5 | Status: SHIPPED | OUTPATIENT
Start: 2017-12-18

## 2017-12-26 RX ORDER — LOSARTAN POTASSIUM 25 MG/1
TABLET ORAL
Qty: 90 TAB | Refills: 0 | Status: SHIPPED | COMMUNITY
Start: 2017-12-26 | End: 2018-09-11 | Stop reason: SDUPTHER

## 2018-09-11 RX ORDER — LOSARTAN POTASSIUM 25 MG/1
TABLET ORAL
Qty: 90 TAB | Refills: 0 | Status: SHIPPED | OUTPATIENT
Start: 2018-09-11

## 2022-03-19 PROBLEM — Z09 S/P UMBILICAL HERNIA REPAIR, FOLLOW-UP EXAM: Status: ACTIVE | Noted: 2017-04-18

## 2023-05-25 RX ORDER — HYDROCODONE BITARTRATE AND ACETAMINOPHEN 5; 325 MG/1; MG/1
1 TABLET ORAL EVERY 4 HOURS PRN
COMMUNITY
Start: 2017-03-29

## 2023-05-25 RX ORDER — SILDENAFIL 100 MG/1
100 TABLET, FILM COATED ORAL PRN
COMMUNITY
Start: 2015-06-08

## 2023-05-25 RX ORDER — NICOTINE 21 MG/24HR
1 PATCH, TRANSDERMAL 24 HOURS TRANSDERMAL EVERY 24 HOURS
COMMUNITY

## 2023-05-25 RX ORDER — ATORVASTATIN CALCIUM 40 MG/1
40 TABLET, FILM COATED ORAL DAILY
COMMUNITY
Start: 2017-02-17

## 2023-05-25 RX ORDER — PIOGLITAZONEHYDROCHLORIDE 45 MG/1
45 TABLET ORAL
COMMUNITY
Start: 2017-04-04

## 2023-05-25 RX ORDER — GLIPIZIDE 10 MG/1
1 TABLET ORAL 2 TIMES DAILY
COMMUNITY
Start: 2017-02-17

## 2023-05-25 RX ORDER — LOSARTAN POTASSIUM 25 MG/1
1 TABLET ORAL DAILY
COMMUNITY
Start: 2018-09-11

## 2025-01-27 NOTE — PROGRESS NOTES
1. Have you been to the ER, urgent care clinic since your last visit? Hospitalized since your last visit? No    2. Have you seen or consulted any other health care providers outside of the [address] [place] [address] since your last visit? Include any pap smears or colon screening.  No No indicators present

## 2025-03-12 ENCOUNTER — TRANSCRIBE ORDERS (OUTPATIENT)
Facility: HOSPITAL | Age: 65
End: 2025-03-12

## 2025-03-12 DIAGNOSIS — E11.65 TYPE 2 DIABETES MELLITUS WITH HYPERGLYCEMIA, UNSPECIFIED WHETHER LONG TERM INSULIN USE (HCC): Primary | ICD-10-CM

## (undated) DEVICE — SUTURE PDS II SZ 1 L27IN ABSRB VLT CT-1 L36MM 1/2 CIR Z341H

## (undated) DEVICE — DERMABOND SKIN ADH 0.7ML -- DERMABOND ADVANCED 12/BX

## (undated) DEVICE — SUTURE MCRYL SZ 4-0 L18IN ABSRB UD L19MM PS-2 3/8 CIR PRIM Y496G

## (undated) DEVICE — 1200 GUARD II KIT W/5MM TUBE W/O VAC TUBE: Brand: GUARDIAN

## (undated) DEVICE — SOLUTION IV 1000ML 0.9% SOD CHL

## (undated) DEVICE — SURGICAL PROCEDURE PACK BASIN MAJ SET CUST NO CAUT

## (undated) DEVICE — TOWEL SURG W17XL27IN STD BLU COT NONFENESTRATED PREWASHED

## (undated) DEVICE — GOWN,SIRUS,FABRNF,XL,20/CS: Brand: MEDLINE

## (undated) DEVICE — SUTURE SZ 0 27IN 5/8 CIR UR-6  TAPER PT VIOLET ABSRB VICRYL J603H

## (undated) DEVICE — INTENDED FOR TISSUE SEPARATION, AND OTHER PROCEDURES THAT REQUIRE A SHARP SURGICAL BLADE TO PUNCTURE OR CUT.: Brand: BARD-PARKER ® CARBON RIB-BACK BLADES

## (undated) DEVICE — STERILE POLYISOPRENE POWDER-FREE SURGICAL GLOVES: Brand: PROTEXIS

## (undated) DEVICE — COTTON BALLS: Brand: DEROYAL

## (undated) DEVICE — DBD-PACK,LAPAROTOMY,2 REINFORCED GOWNS: Brand: MEDLINE

## (undated) DEVICE — REM POLYHESIVE ADULT PATIENT RETURN ELECTRODE: Brand: VALLEYLAB

## (undated) DEVICE — SUTURE MCRYL SZ 4-0 L27IN ABSRB UD L19MM PS-2 1/2 CIR PRIM Y426H

## (undated) DEVICE — (D)PREP SKN CHLRAPRP APPL 26ML -- CONVERT TO ITEM 371833

## (undated) DEVICE — ROCKER SWITCH PENCIL BLADE ELECTRODE, HOLSTER: Brand: EDGE

## (undated) DEVICE — SUTURE VCRL SZ 3-0 L27IN ABSRB UD L26MM SH 1/2 CIR J416H

## (undated) DEVICE — INFECTION CONTROL KIT SYS

## (undated) DEVICE — SUTURE VCRL SZ 0 L27IN ABSRB UD L26MM CT-2 1/2 CIR J270H

## (undated) DEVICE — SUTURE VCRL SZ 2-0 L27IN ABSRB UD L26MM SH 1/2 CIR J417H